# Patient Record
Sex: MALE | Race: OTHER | NOT HISPANIC OR LATINO | Employment: FULL TIME | URBAN - METROPOLITAN AREA
[De-identification: names, ages, dates, MRNs, and addresses within clinical notes are randomized per-mention and may not be internally consistent; named-entity substitution may affect disease eponyms.]

---

## 2019-10-29 ENCOUNTER — APPOINTMENT (OUTPATIENT)
Dept: RADIOLOGY | Facility: MEDICAL CENTER | Age: 39
DRG: 291 | End: 2019-10-29
Attending: EMERGENCY MEDICINE

## 2019-10-29 ENCOUNTER — HOSPITAL ENCOUNTER (INPATIENT)
Facility: MEDICAL CENTER | Age: 39
LOS: 5 days | DRG: 291 | End: 2019-11-03
Attending: EMERGENCY MEDICINE | Admitting: HOSPITALIST

## 2019-10-29 ENCOUNTER — APPOINTMENT (OUTPATIENT)
Dept: RADIOLOGY | Facility: MEDICAL CENTER | Age: 39
DRG: 291 | End: 2019-10-29

## 2019-10-29 DIAGNOSIS — I50.9 CONGESTIVE HEART FAILURE, UNSPECIFIED HF CHRONICITY, UNSPECIFIED HEART FAILURE TYPE (HCC): ICD-10-CM

## 2019-10-29 DIAGNOSIS — R60.1 ANASARCA: ICD-10-CM

## 2019-10-29 PROBLEM — N17.9 AKI (ACUTE KIDNEY INJURY) (HCC): Status: ACTIVE | Noted: 2019-10-29

## 2019-10-29 PROBLEM — K75.9 HEPATITIS: Status: ACTIVE | Noted: 2019-10-29

## 2019-10-29 PROBLEM — E66.811 CLASS 1 OBESITY DUE TO EXCESS CALORIES WITHOUT SERIOUS COMORBIDITY WITH BODY MASS INDEX (BMI) OF 31.0 TO 31.9 IN ADULT: Status: ACTIVE | Noted: 2019-10-29

## 2019-10-29 PROBLEM — E66.09 CLASS 1 OBESITY DUE TO EXCESS CALORIES WITHOUT SERIOUS COMORBIDITY WITH BODY MASS INDEX (BMI) OF 31.0 TO 31.9 IN ADULT: Status: ACTIVE | Noted: 2019-10-29

## 2019-10-29 LAB
ALBUMIN SERPL BCP-MCNC: 4 G/DL (ref 3.2–4.9)
ALBUMIN/GLOB SERPL: 1.6 G/DL
ALP SERPL-CCNC: 96 U/L (ref 30–99)
ALT SERPL-CCNC: 225 U/L (ref 2–50)
ANION GAP SERPL CALC-SCNC: 12 MMOL/L (ref 0–11.9)
AST SERPL-CCNC: 231 U/L (ref 12–45)
BASOPHILS # BLD AUTO: 1.1 % (ref 0–1.8)
BASOPHILS # BLD: 0.08 K/UL (ref 0–0.12)
BILIRUB SERPL-MCNC: 2.1 MG/DL (ref 0.1–1.5)
BUN SERPL-MCNC: 24 MG/DL (ref 8–22)
CALCIUM SERPL-MCNC: 8.8 MG/DL (ref 8.5–10.5)
CHLORIDE SERPL-SCNC: 102 MMOL/L (ref 96–112)
CO2 SERPL-SCNC: 22 MMOL/L (ref 20–33)
CREAT SERPL-MCNC: 1.67 MG/DL (ref 0.5–1.4)
EKG IMPRESSION: NORMAL
EOSINOPHIL # BLD AUTO: 0.12 K/UL (ref 0–0.51)
EOSINOPHIL NFR BLD: 1.6 % (ref 0–6.9)
ERYTHROCYTE [DISTWIDTH] IN BLOOD BY AUTOMATED COUNT: 52.4 FL (ref 35.9–50)
GLOBULIN SER CALC-MCNC: 2.5 G/DL (ref 1.9–3.5)
GLUCOSE SERPL-MCNC: 102 MG/DL (ref 65–99)
HCT VFR BLD AUTO: 57.2 % (ref 42–52)
HGB BLD-MCNC: 18.5 G/DL (ref 14–18)
IMM GRANULOCYTES # BLD AUTO: 0.02 K/UL (ref 0–0.11)
IMM GRANULOCYTES NFR BLD AUTO: 0.3 % (ref 0–0.9)
LYMPHOCYTES # BLD AUTO: 3.14 K/UL (ref 1–4.8)
LYMPHOCYTES NFR BLD: 43 % (ref 22–41)
MCH RBC QN AUTO: 32.7 PG (ref 27–33)
MCHC RBC AUTO-ENTMCNC: 32.3 G/DL (ref 33.7–35.3)
MCV RBC AUTO: 101.2 FL (ref 81.4–97.8)
MONOCYTES # BLD AUTO: 0.46 K/UL (ref 0–0.85)
MONOCYTES NFR BLD AUTO: 6.3 % (ref 0–13.4)
NEUTROPHILS # BLD AUTO: 3.49 K/UL (ref 1.82–7.42)
NEUTROPHILS NFR BLD: 47.7 % (ref 44–72)
NRBC # BLD AUTO: 0 K/UL
NRBC BLD-RTO: 0 /100 WBC
NT-PROBNP SERPL IA-MCNC: 3660 PG/ML (ref 0–125)
PLATELET # BLD AUTO: 260 K/UL (ref 164–446)
PMV BLD AUTO: 9.4 FL (ref 9–12.9)
POTASSIUM SERPL-SCNC: 4.9 MMOL/L (ref 3.6–5.5)
PROT SERPL-MCNC: 6.5 G/DL (ref 6–8.2)
RBC # BLD AUTO: 5.65 M/UL (ref 4.7–6.1)
SODIUM SERPL-SCNC: 136 MMOL/L (ref 135–145)
WBC # BLD AUTO: 7.3 K/UL (ref 4.8–10.8)

## 2019-10-29 PROCEDURE — 36415 COLL VENOUS BLD VENIPUNCTURE: CPT

## 2019-10-29 PROCEDURE — 99223 1ST HOSP IP/OBS HIGH 75: CPT | Mod: AI | Performed by: HOSPITALIST

## 2019-10-29 PROCEDURE — 84484 ASSAY OF TROPONIN QUANT: CPT | Mod: EDC

## 2019-10-29 PROCEDURE — 71275 CT ANGIOGRAPHY CHEST: CPT

## 2019-10-29 PROCEDURE — 83880 ASSAY OF NATRIURETIC PEPTIDE: CPT | Mod: EDC

## 2019-10-29 PROCEDURE — 700111 HCHG RX REV CODE 636 W/ 250 OVERRIDE (IP): Performed by: EMERGENCY MEDICINE

## 2019-10-29 PROCEDURE — 93005 ELECTROCARDIOGRAM TRACING: CPT | Performed by: EMERGENCY MEDICINE

## 2019-10-29 PROCEDURE — 71045 X-RAY EXAM CHEST 1 VIEW: CPT

## 2019-10-29 PROCEDURE — 700117 HCHG RX CONTRAST REV CODE 255: Performed by: EMERGENCY MEDICINE

## 2019-10-29 PROCEDURE — 85025 COMPLETE CBC W/AUTO DIFF WBC: CPT | Mod: EDC

## 2019-10-29 PROCEDURE — 80307 DRUG TEST PRSMV CHEM ANLYZR: CPT | Mod: EDC

## 2019-10-29 PROCEDURE — 770020 HCHG ROOM/CARE - TELE (206): Mod: EDC

## 2019-10-29 PROCEDURE — 80053 COMPREHEN METABOLIC PANEL: CPT | Mod: EDC

## 2019-10-29 PROCEDURE — 84443 ASSAY THYROID STIM HORMONE: CPT | Mod: EDC

## 2019-10-29 PROCEDURE — 93005 ELECTROCARDIOGRAM TRACING: CPT

## 2019-10-29 PROCEDURE — 99285 EMERGENCY DEPT VISIT HI MDM: CPT | Mod: EDC

## 2019-10-29 PROCEDURE — 96374 THER/PROPH/DIAG INJ IV PUSH: CPT | Mod: EDC

## 2019-10-29 RX ORDER — HEPARIN SODIUM 5000 [USP'U]/ML
5000 INJECTION, SOLUTION INTRAVENOUS; SUBCUTANEOUS EVERY 8 HOURS
Status: DISCONTINUED | OUTPATIENT
Start: 2019-10-30 | End: 2019-11-03 | Stop reason: HOSPADM

## 2019-10-29 RX ORDER — POLYETHYLENE GLYCOL 3350 17 G/17G
1 POWDER, FOR SOLUTION ORAL
Status: DISCONTINUED | OUTPATIENT
Start: 2019-10-29 | End: 2019-11-03 | Stop reason: HOSPADM

## 2019-10-29 RX ORDER — ONDANSETRON 2 MG/ML
4 INJECTION INTRAMUSCULAR; INTRAVENOUS EVERY 4 HOURS PRN
Status: DISCONTINUED | OUTPATIENT
Start: 2019-10-29 | End: 2019-11-03 | Stop reason: HOSPADM

## 2019-10-29 RX ORDER — PROCHLORPERAZINE EDISYLATE 5 MG/ML
5-10 INJECTION INTRAMUSCULAR; INTRAVENOUS EVERY 4 HOURS PRN
Status: DISCONTINUED | OUTPATIENT
Start: 2019-10-29 | End: 2019-11-03 | Stop reason: HOSPADM

## 2019-10-29 RX ORDER — CLONIDINE HYDROCHLORIDE 0.1 MG/1
0.1 TABLET ORAL EVERY 6 HOURS PRN
Status: DISCONTINUED | OUTPATIENT
Start: 2019-10-29 | End: 2019-11-03 | Stop reason: HOSPADM

## 2019-10-29 RX ORDER — FUROSEMIDE 10 MG/ML
40 INJECTION INTRAMUSCULAR; INTRAVENOUS
Status: DISCONTINUED | OUTPATIENT
Start: 2019-10-30 | End: 2019-11-03 | Stop reason: HOSPADM

## 2019-10-29 RX ORDER — BISACODYL 10 MG
10 SUPPOSITORY, RECTAL RECTAL
Status: DISCONTINUED | OUTPATIENT
Start: 2019-10-29 | End: 2019-11-03 | Stop reason: HOSPADM

## 2019-10-29 RX ORDER — PROMETHAZINE HYDROCHLORIDE 25 MG/1
12.5-25 SUPPOSITORY RECTAL EVERY 4 HOURS PRN
Status: DISCONTINUED | OUTPATIENT
Start: 2019-10-29 | End: 2019-11-03 | Stop reason: HOSPADM

## 2019-10-29 RX ORDER — PROMETHAZINE HYDROCHLORIDE 25 MG/1
12.5-25 TABLET ORAL EVERY 4 HOURS PRN
Status: DISCONTINUED | OUTPATIENT
Start: 2019-10-29 | End: 2019-11-03 | Stop reason: HOSPADM

## 2019-10-29 RX ORDER — ONDANSETRON 4 MG/1
4 TABLET, ORALLY DISINTEGRATING ORAL EVERY 4 HOURS PRN
Status: DISCONTINUED | OUTPATIENT
Start: 2019-10-29 | End: 2019-11-03 | Stop reason: HOSPADM

## 2019-10-29 RX ORDER — AMOXICILLIN 250 MG
2 CAPSULE ORAL 2 TIMES DAILY
Status: DISCONTINUED | OUTPATIENT
Start: 2019-10-30 | End: 2019-11-03 | Stop reason: HOSPADM

## 2019-10-29 RX ORDER — FUROSEMIDE 10 MG/ML
40 INJECTION INTRAMUSCULAR; INTRAVENOUS ONCE
Status: COMPLETED | OUTPATIENT
Start: 2019-10-29 | End: 2019-10-29

## 2019-10-29 RX ORDER — ACETAMINOPHEN 325 MG/1
650 TABLET ORAL EVERY 6 HOURS PRN
Status: DISCONTINUED | OUTPATIENT
Start: 2019-10-29 | End: 2019-11-03 | Stop reason: HOSPADM

## 2019-10-29 RX ADMIN — FUROSEMIDE 40 MG: 10 INJECTION, SOLUTION INTRAMUSCULAR; INTRAVENOUS at 23:24

## 2019-10-29 RX ADMIN — IOHEXOL 100 ML: 350 INJECTION, SOLUTION INTRAVENOUS at 20:33

## 2019-10-29 ASSESSMENT — ENCOUNTER SYMPTOMS
SHORTNESS OF BREATH: 1
EYES NEGATIVE: 1
MUSCULOSKELETAL NEGATIVE: 1
CONSTITUTIONAL NEGATIVE: 1
GASTROINTESTINAL NEGATIVE: 1
NEUROLOGICAL NEGATIVE: 1
PSYCHIATRIC NEGATIVE: 1

## 2019-10-30 ENCOUNTER — APPOINTMENT (OUTPATIENT)
Dept: CARDIOLOGY | Facility: MEDICAL CENTER | Age: 39
DRG: 291 | End: 2019-10-30
Attending: HOSPITALIST

## 2019-10-30 PROBLEM — I50.21 ACUTE SYSTOLIC HEART FAILURE (HCC): Status: ACTIVE | Noted: 2019-10-30

## 2019-10-30 PROBLEM — I42.9 CARDIOMYOPATHY (HCC): Status: ACTIVE | Noted: 2019-10-29

## 2019-10-30 LAB
AMPHET UR QL SCN: POSITIVE
ANION GAP SERPL CALC-SCNC: 10 MMOL/L (ref 0–11.9)
BARBITURATES UR QL SCN: NEGATIVE
BASOPHILS # BLD AUTO: 0.7 % (ref 0–1.8)
BASOPHILS # BLD: 0.06 K/UL (ref 0–0.12)
BENZODIAZ UR QL SCN: NEGATIVE
BUN SERPL-MCNC: 22 MG/DL (ref 8–22)
BZE UR QL SCN: NEGATIVE
CALCIUM SERPL-MCNC: 8.4 MG/DL (ref 8.5–10.5)
CANNABINOIDS UR QL SCN: NEGATIVE
CHLORIDE SERPL-SCNC: 105 MMOL/L (ref 96–112)
CO2 SERPL-SCNC: 23 MMOL/L (ref 20–33)
CREAT SERPL-MCNC: 1.35 MG/DL (ref 0.5–1.4)
EOSINOPHIL # BLD AUTO: 0.1 K/UL (ref 0–0.51)
EOSINOPHIL NFR BLD: 1.2 % (ref 0–6.9)
ERYTHROCYTE [DISTWIDTH] IN BLOOD BY AUTOMATED COUNT: 48.7 FL (ref 35.9–50)
GLUCOSE SERPL-MCNC: 154 MG/DL (ref 65–99)
HCT VFR BLD AUTO: 50.6 % (ref 42–52)
HGB BLD-MCNC: 17.1 G/DL (ref 14–18)
IMM GRANULOCYTES # BLD AUTO: 0.02 K/UL (ref 0–0.11)
IMM GRANULOCYTES NFR BLD AUTO: 0.2 % (ref 0–0.9)
LV EJECT FRACT  99904: 20
LV EJECT FRACT MOD 2C 99903: 26.42
LV EJECT FRACT MOD 4C 99902: 30.1
LV EJECT FRACT MOD BP 99901: 26.62
LYMPHOCYTES # BLD AUTO: 3.09 K/UL (ref 1–4.8)
LYMPHOCYTES NFR BLD: 38.3 % (ref 22–41)
MCH RBC QN AUTO: 32.7 PG (ref 27–33)
MCHC RBC AUTO-ENTMCNC: 33.8 G/DL (ref 33.7–35.3)
MCV RBC AUTO: 96.7 FL (ref 81.4–97.8)
METHADONE UR QL SCN: NEGATIVE
MONOCYTES # BLD AUTO: 0.72 K/UL (ref 0–0.85)
MONOCYTES NFR BLD AUTO: 8.9 % (ref 0–13.4)
NEUTROPHILS # BLD AUTO: 4.07 K/UL (ref 1.82–7.42)
NEUTROPHILS NFR BLD: 50.7 % (ref 44–72)
NRBC # BLD AUTO: 0.02 K/UL
NRBC BLD-RTO: 0.2 /100 WBC
NT-PROBNP SERPL IA-MCNC: 2748 PG/ML (ref 0–125)
OPIATES UR QL SCN: NEGATIVE
OXYCODONE UR QL SCN: NEGATIVE
PCP UR QL SCN: NEGATIVE
PLATELET # BLD AUTO: 271 K/UL (ref 164–446)
PMV BLD AUTO: 9 FL (ref 9–12.9)
POTASSIUM SERPL-SCNC: 3.6 MMOL/L (ref 3.6–5.5)
PROPOXYPH UR QL SCN: NEGATIVE
RBC # BLD AUTO: 5.23 M/UL (ref 4.7–6.1)
SODIUM SERPL-SCNC: 138 MMOL/L (ref 135–145)
TROPONIN T SERPL-MCNC: 102 NG/L (ref 6–19)
TROPONIN T SERPL-MCNC: 77 NG/L (ref 6–19)
TSH SERPL DL<=0.005 MIU/L-ACNC: 6.12 UIU/ML (ref 0.38–5.33)
WBC # BLD AUTO: 8.1 K/UL (ref 4.8–10.8)

## 2019-10-30 PROCEDURE — 83880 ASSAY OF NATRIURETIC PEPTIDE: CPT | Mod: EDC

## 2019-10-30 PROCEDURE — 90471 IMMUNIZATION ADMIN: CPT | Mod: EDC

## 2019-10-30 PROCEDURE — A9270 NON-COVERED ITEM OR SERVICE: HCPCS | Mod: EDC | Performed by: HOSPITALIST

## 2019-10-30 PROCEDURE — 85025 COMPLETE CBC W/AUTO DIFF WBC: CPT | Mod: EDC

## 2019-10-30 PROCEDURE — 84484 ASSAY OF TROPONIN QUANT: CPT | Mod: EDC

## 2019-10-30 PROCEDURE — 93306 TTE W/DOPPLER COMPLETE: CPT

## 2019-10-30 PROCEDURE — 99254 IP/OBS CNSLTJ NEW/EST MOD 60: CPT | Performed by: INTERNAL MEDICINE

## 2019-10-30 PROCEDURE — 700111 HCHG RX REV CODE 636 W/ 250 OVERRIDE (IP): Mod: EDC | Performed by: FAMILY MEDICINE

## 2019-10-30 PROCEDURE — 80048 BASIC METABOLIC PNL TOTAL CA: CPT | Mod: EDC

## 2019-10-30 PROCEDURE — 700102 HCHG RX REV CODE 250 W/ 637 OVERRIDE(OP): Mod: EDC | Performed by: HOSPITALIST

## 2019-10-30 PROCEDURE — 700117 HCHG RX CONTRAST REV CODE 255: Mod: EDC | Performed by: HOSPITALIST

## 2019-10-30 PROCEDURE — 99233 SBSQ HOSP IP/OBS HIGH 50: CPT | Performed by: HOSPITALIST

## 2019-10-30 PROCEDURE — 700111 HCHG RX REV CODE 636 W/ 250 OVERRIDE (IP): Mod: EDC | Performed by: HOSPITALIST

## 2019-10-30 PROCEDURE — 96376 TX/PRO/DX INJ SAME DRUG ADON: CPT | Mod: EDC

## 2019-10-30 PROCEDURE — 770020 HCHG ROOM/CARE - TELE (206): Mod: EDC

## 2019-10-30 PROCEDURE — 90686 IIV4 VACC NO PRSV 0.5 ML IM: CPT | Mod: EDC | Performed by: FAMILY MEDICINE

## 2019-10-30 PROCEDURE — 93306 TTE W/DOPPLER COMPLETE: CPT | Mod: 26 | Performed by: INTERNAL MEDICINE

## 2019-10-30 PROCEDURE — 96372 THER/PROPH/DIAG INJ SC/IM: CPT | Mod: EDC

## 2019-10-30 RX ADMIN — INFLUENZA A VIRUS A/BRISBANE/02/2018 IVR-190 (H1N1) ANTIGEN (FORMALDEHYDE INACTIVATED), INFLUENZA A VIRUS A/KANSAS/14/2017 X-327 (H3N2) ANTIGEN (FORMALDEHYDE INACTIVATED), INFLUENZA B VIRUS B/PHUKET/3073/2013 ANTIGEN (FORMALDEHYDE INACTIVATED), AND INFLUENZA B VIRUS B/MARYLAND/15/2016 BX-69A ANTIGEN (FORMALDEHYDE INACTIVATED) 0.5 ML: 15; 15; 15; 15 INJECTION, SUSPENSION INTRAMUSCULAR at 18:14

## 2019-10-30 RX ADMIN — HEPARIN SODIUM 5000 UNITS: 5000 INJECTION, SOLUTION INTRAVENOUS; SUBCUTANEOUS at 06:23

## 2019-10-30 RX ADMIN — FUROSEMIDE 40 MG: 10 INJECTION, SOLUTION INTRAMUSCULAR; INTRAVENOUS at 06:23

## 2019-10-30 RX ADMIN — FUROSEMIDE 40 MG: 10 INJECTION, SOLUTION INTRAMUSCULAR; INTRAVENOUS at 16:20

## 2019-10-30 RX ADMIN — HEPARIN SODIUM 5000 UNITS: 5000 INJECTION, SOLUTION INTRAVENOUS; SUBCUTANEOUS at 14:35

## 2019-10-30 RX ADMIN — SENNOSIDES, DOCUSATE SODIUM 2 TABLET: 50; 8.6 TABLET, FILM COATED ORAL at 06:23

## 2019-10-30 RX ADMIN — HUMAN ALBUMIN MICROSPHERES AND PERFLUTREN 3 ML: 10; .22 INJECTION, SOLUTION INTRAVENOUS at 15:54

## 2019-10-30 RX ADMIN — HEPARIN SODIUM 5000 UNITS: 5000 INJECTION, SOLUTION INTRAVENOUS; SUBCUTANEOUS at 21:20

## 2019-10-30 SDOH — HEALTH STABILITY: MENTAL HEALTH: HOW OFTEN DO YOU HAVE A DRINK CONTAINING ALCOHOL?: 2-4 TIMES A MONTH

## 2019-10-30 SDOH — HEALTH STABILITY: MENTAL HEALTH: HOW OFTEN DO YOU HAVE 6 OR MORE DRINKS ON ONE OCCASION?: MONTHLY

## 2019-10-30 SDOH — HEALTH STABILITY: MENTAL HEALTH: HOW MANY STANDARD DRINKS CONTAINING ALCOHOL DO YOU HAVE ON A TYPICAL DAY?: 10 OR MORE

## 2019-10-30 ASSESSMENT — COGNITIVE AND FUNCTIONAL STATUS - GENERAL
SUGGESTED CMS G CODE MODIFIER MOBILITY: CH
DRESSING REGULAR LOWER BODY CLOTHING: A LITTLE
DAILY ACTIVITIY SCORE: 23
MOBILITY SCORE: 24
SUGGESTED CMS G CODE MODIFIER DAILY ACTIVITY: CI

## 2019-10-30 ASSESSMENT — ENCOUNTER SYMPTOMS
ORTHOPNEA: 1
MYALGIAS: 0
VOMITING: 0
FLANK PAIN: 0
HEMOPTYSIS: 0
SORE THROAT: 0
EYE DISCHARGE: 0
DIZZINESS: 0
ACTIVITY CHANGE: 0
APPETITE CHANGE: 0
CHEST TIGHTNESS: 0
ABDOMINAL DISTENTION: 0
DIARRHEA: 0
HALLUCINATIONS: 0
NERVOUS/ANXIOUS: 0
AGITATION: 0
EYE ITCHING: 0
CHILLS: 0
WEAKNESS: 0
APNEA: 0
SHORTNESS OF BREATH: 1
SPUTUM PRODUCTION: 0
COUGH: 0
EYE REDNESS: 0
BACK PAIN: 0
EYE PAIN: 0
LOSS OF CONSCIOUSNESS: 0
ADENOPATHY: 0
STRIDOR: 0
FEVER: 0
SPEECH CHANGE: 0
BLOOD IN STOOL: 0
PND: 1
BRUISES/BLEEDS EASILY: 0
PALPITATIONS: 0
FOCAL WEAKNESS: 0
ABDOMINAL PAIN: 0
SEIZURES: 0
WEAKNESS: 1

## 2019-10-30 ASSESSMENT — LIFESTYLE VARIABLES
EVER_SMOKED: YES
HAVE YOU EVER FELT YOU SHOULD CUT DOWN ON YOUR DRINKING: YES
ALCOHOL_USE: YES

## 2019-10-30 ASSESSMENT — PATIENT HEALTH QUESTIONNAIRE - PHQ9
7. TROUBLE CONCENTRATING ON THINGS, SUCH AS READING THE NEWSPAPER OR WATCHING TELEVISION: MORE THAN HALF THE DAYS
5. POOR APPETITE OR OVEREATING: MORE THAN HALF THE DAYS
3. TROUBLE FALLING OR STAYING ASLEEP OR SLEEPING TOO MUCH: NOT AT ALL
6. FEELING BAD ABOUT YOURSELF - OR THAT YOU ARE A FAILURE OR HAVE LET YOURSELF OR YOUR FAMILY DOWN: NOT AL ALL
SUM OF ALL RESPONSES TO PHQ9 QUESTIONS 1 AND 2: 6
1. LITTLE INTEREST OR PLEASURE IN DOING THINGS: NEARLY EVERY DAY
2. FEELING DOWN, DEPRESSED, IRRITABLE, OR HOPELESS: NEARLY EVERY DAY
8. MOVING OR SPEAKING SO SLOWLY THAT OTHER PEOPLE COULD HAVE NOTICED. OR THE OPPOSITE, BEING SO FIGETY OR RESTLESS THAT YOU HAVE BEEN MOVING AROUND A LOT MORE THAN USUAL: NEARLY EVERY DAY
4. FEELING TIRED OR HAVING LITTLE ENERGY: NEARLY EVERY DAY
9. THOUGHTS THAT YOU WOULD BE BETTER OFF DEAD, OR OF HURTING YOURSELF: NOT AT ALL
SUM OF ALL RESPONSES TO PHQ QUESTIONS 1-9: 16

## 2019-10-30 ASSESSMENT — COPD QUESTIONNAIRES
IN THE PAST 12 MONTHS DO YOU DO LESS THAN YOU USED TO BECAUSE OF YOUR BREATHING PROBLEMS: DISAGREE/UNSURE
DO YOU EVER COUGH UP ANY MUCUS OR PHLEGM?: NO/ONLY WITH OCCASIONAL COLDS OR INFECTIONS
COPD SCREENING SCORE: 4
DURING THE PAST 4 WEEKS HOW MUCH DID YOU FEEL SHORT OF BREATH: MOST  OR ALL OF THE TIME
HAVE YOU SMOKED AT LEAST 100 CIGARETTES IN YOUR ENTIRE LIFE: YES

## 2019-10-30 NOTE — DISCHARGE PLANNING
Medical Social Work    Referral: Code Aorta    Intervention: Pt is a 39 year old male: Fariha Ortega (Frank) (: 1980).  Pt's friend is at bedside; however, she did not want to provide her name or contact information at this time.    Plan: SW will follow as needed.

## 2019-10-30 NOTE — ED NOTES
Med rec updated and complete. Allergies reviewed   Pt denies taking medications.  Home pharmacy [facility] .

## 2019-10-30 NOTE — ED NOTES
Patient resting comfortably. Chest rise and fall noted. Call light within reach. Patient given urinal and instructed to keep track of his input and output.  Will Continue to monitor.

## 2019-10-30 NOTE — PROGRESS NOTES
Hospital Medicine Daily Progress Note    Date of Service  10/30/2019    Chief Complaint  39 y.o. 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  Patient has been started on intravenous diuretics echocardiography ordered, and cardiology consulted.         Interval Problem Update  Tachycardic heart rate 110-119, continue cardiac monitoring.  We will hold on starting beta-blockers given his acute heart failure and methamphetamine use.  Saturating well on room air however reports shortness of breath with ambulation.  Marked lower extremity edema with bullae on the left lower extremity.  Acute kidney injury, creatinine 1.67, questionable if this is cardiorenal syndrome, will see response to diuretics.   Troponin mildly elevated 102, trended down to 77, no chest pain  BNP elevated 3660.   I am consulting cardiology, for concern for methamphetamine induced cardiomyopathy.  Patient has elevated liver enzymes, I will check hepatitis panel   Discussed with patient, cardiology patient's nurse and with multidisciplinary team during rounds including , pharmacist and charge nurse.      Consultants/Specialty  Cardiology    Code Status  Full    Disposition  Wants to leave tomorrow to catch courts at 2 PM   Will need follow-up with cardiology within 7 days    Review of Systems  Review of Systems   Constitutional: Positive for malaise/fatigue. Negative for chills and fever.   HENT: Negative for congestion and sore throat.    Eyes: Negative for pain, discharge and redness.   Respiratory: Positive for shortness of breath. Negative for cough, hemoptysis, sputum production and stridor.    Cardiovascular: Positive for orthopnea, leg swelling and PND. Negative for chest pain and palpitations.    Gastrointestinal: Negative for abdominal pain, blood in stool, diarrhea and vomiting.   Genitourinary: Negative for flank pain, hematuria and urgency.   Musculoskeletal: Negative for myalgias.   Skin: Negative for rash.   Neurological: Positive for weakness. Negative for speech change, focal weakness, seizures and loss of consciousness.   Endo/Heme/Allergies: Does not bruise/bleed easily.   Psychiatric/Behavioral: Negative for hallucinations and suicidal ideas. The patient is not nervous/anxious.       Physical Exam  Temp:  [36.5 °C (97.7 °F)] 36.5 °C (97.7 °F)  Pulse:  [110-120] 115  Resp:  [12-20] 20  BP: (100-152)/() 121/82  SpO2:  [84 %-100 %] 98 %    Physical Exam   Constitutional: He is oriented to person, place, and time. He appears well-developed.   HENT:   Head: Normocephalic and atraumatic.   Right Ear: External ear normal.   Left Ear: External ear normal.   Eyes: Pupils are equal, round, and reactive to light. Right eye exhibits no discharge. Left eye exhibits no discharge. No scleral icterus.   Neck: Normal range of motion. Neck supple. JVD present. No tracheal deviation present. No thyromegaly present.   Cardiovascular: Exam reveals no gallop and no friction rub.   Pulmonary/Chest: No stridor. He has no wheezes. He has rales.   Reduced air entry at the lower zones   B/L basal crepitations   Abdominal: Soft. He exhibits no distension. There is no tenderness. There is no rebound.   Musculoskeletal: He exhibits edema. He exhibits no tenderness or deformity.   Neurological: He is alert and oriented to person, place, and time. No cranial nerve deficit. Coordination normal.   Skin: Skin is warm and dry.   Psychiatric: He has a normal mood and affect. Judgment normal.   Nursing note and vitals reviewed.    Fluids    Intake/Output Summary (Last 24 hours) at 10/30/2019 1836  Last data filed at 10/30/2019 1830  Gross per 24 hour   Intake 480 ml   Output 6675 ml   Net -6195 ml       Laboratory  Recent Labs      10/29/19  2012 10/30/19  0300   WBC 7.3 8.1   RBC 5.65 5.23   HEMOGLOBIN 18.5* 17.1   HEMATOCRIT 57.2* 50.6   .2* 96.7   MCH 32.7 32.7   MCHC 32.3* 33.8   RDW 52.4* 48.7   PLATELETCT 260 271   MPV 9.4 9.0     Recent Labs     10/29/19  2012 10/30/19  0300   SODIUM 136 138   POTASSIUM 4.9 3.6   CHLORIDE 102 105   CO2 22 23   GLUCOSE 102* 154*   BUN 24* 22   CREATININE 1.67* 1.35   CALCIUM 8.8 8.4*                   Imaging  EC-ECHOCARDIOGRAM COMPLETE W/ CONT   Final Result      CT-CTA COMPLETE THORACOABDOMINAL AORTA   Final Result      Multichamber cardiac enlargement with inferior vena cava dilatation and anasarca. These findings are compatible with elevated end-diastolic pressures/congestive failure      Small right, minute left pleural effusions, small pericardial effusion, small ascites            DX-CHEST-PORTABLE (1 VIEW)   Final Result         1.  There is moderate cardiomegaly and mild perihilar congestive changes.      2.  No consolidations identified.           Assessment/Plan  * Acute systolic heart failure (HCC)- (present on admission)  Assessment & Plan  Diuresing.  Input and output.  Daily weights.  Watch volume status closely.  Continuous cardiac monitoring.  Not started on beta-blockers due to acute exacerbation and methamphetamine use.   Not started on ACE inhibitor or Spironolactone due to current renal function   Will need follow-up with cardiology within 7 days after discharge.    CHRIS (acute kidney injury) (HCC)  Assessment & Plan  Likely secondary to cardiorenal syndrome.  Watch response to diuretics     Cardiomyopathy (HCC)  Assessment & Plan  Likely secondary to methamphetamine   Counseling provided    Hepatitis  Assessment & Plan  I will check hepatitis panel   Likely secondary to hepatic congestion, continue to monitor, watch response to diuretics      Class 1 obesity due to excess calories without serious comorbidity with body mass index (BMI) of 31.0 to 31.9 in adult  Assessment &  Plan  Body mass index is 31.78 kg/m².  Counseling provided      VTE prophylaxis: Heparin SC

## 2019-10-30 NOTE — PROGRESS NOTES
Patient diagnosed with HF by admitting physician after he presented on 10/29/19 with SOB and LE edema.    Echocardiogram is pending. Patient has a strong family history of heart disease and utox is positive for amphetamines therefore, CM would not be a surprising or unexpected finding.    Nursing, please wait to begin education on HF until the echo is resulted and physician has discussed with the patient. Please see below for measures to initiate if diagnosis confirmed.    Daily Nurse: please begin to fill out the HF checklist (pink sheet in hard chart) and use it to guide your daily care.    Discharge Nurse: please ensure completeness of the HF checklist (pink sheet in hard chart) and have it co-signed by the charge RN before the patient leaves the hospital.    It looks like patient is uninsured. I've placed a SW order to assess social situation.    Thank you, Jessica, Cardiovascular Nurse Navigator, RN, CHFN [phone]    HF Measures:  1. Documentation of LV systolic function (echo or cath) PTA, during this hospitalization, or plan to assess post discharge or reason for not assessing documented.  2. ACE-I, ARNI or ARB prescribed on discharge for LVEF <40%  3. For HF patients with LVEF less than or equal to 40% evidence based beta blocker must be prescribed upon discharge one of the following: carvedilol, bisoprolol, Toprol XL  4. For EF less than or equal to 35% aldosterone blockade prescribed upon discharge  5. Nutrition consult for diet education  6. HF education documented daily  7. Screening for and administering immunizations as long as no contraindications: Pneumonia and Influenza  8. Written discharge instructions include:  ? Daily weights  ? Record weight on tracker  ? Bring tracker to appointments  ? Call MD for weight gain of 3lb /day or 5lb/week  ? HF medication teaching  ? Low sodium diet  ? Follow up appointment within seven calendar days of d/c must include: date, time and location  ? Activity  ? Worsening  symptoms  What if any of the above HF measures are contraindicated?  ? Request that the discharging provider document the medication/intervention and the contraindication specifically in a progress note  ? For example: “no CHF meds due to hypotension” is not enough. It needs to say: “No ACE-I, ARNI, ARB due to hypotension”; “No Beta Blockade due to bradycardia”…

## 2019-10-30 NOTE — H&P
Hospital Medicine History & Physical Note    Date of Service  10/29/2019    Primary Care Physician  Pcp Pt States None    Consultants  None    Code Status  Full code     Chief Complaint  SHortness of breath, swelling     History of Presenting Illness  39 y.o. male with no prior reported medical history but with strong family history of cardiac disease reports that he was in his usual state of health until the day before admission, when his co-workers noted that he had swollen legs and that he needed to get them checked out.  He is unaware of how long this has been going on.  He reports no additional symptoms on his own, however with some prompting he does endorse shortness of breath, and abdominal swelling as well.  HE denies fever or chills, he has no abdominal pain, nausea or vomiting.  He has no other complaints.      Review of Systems  Review of Systems   Constitutional: Negative.    HENT: Negative.    Eyes: Negative.    Respiratory: Positive for shortness of breath.    Cardiovascular: Positive for chest pain and leg swelling.   Gastrointestinal: Negative.    Genitourinary: Negative.    Musculoskeletal: Negative.    Skin: Negative.    Neurological: Negative.    Endo/Heme/Allergies: Negative.    Psychiatric/Behavioral: Negative.        Past Medical History   has no past medical history on file.    Surgical History   has no past surgical history on file.     Family History  family history includes Cancer in his father; Heart Disease in his mother; Hypertension in his mother.     Social History   reports that he has quit smoking. He has never used smokeless tobacco. He reports that he drinks alcohol. He reports that he has current or past drug history.    Allergies  No Known Allergies    Medications  None       Physical Exam  Temp:  [36.1 °C (97 °F)] 36.1 °C (97 °F)  Pulse:  [111-120] 118  Resp:  [16-18] 16  BP: (100-145)/(58-99) 139/93  SpO2:  [96 %-100 %] 100 %    Physical Exam   Constitutional: He is oriented to  person, place, and time. He appears well-developed and well-nourished. No distress.   HENT:   Head: Normocephalic and atraumatic.   Eyes: Pupils are equal, round, and reactive to light. Conjunctivae are normal.   Neck: Normal range of motion. Neck supple. No tracheal deviation present. No thyromegaly present.   Cardiovascular: Normal rate, regular rhythm and normal heart sounds. Exam reveals no gallop and no friction rub.   No murmur heard.  Pulmonary/Chest: Breath sounds normal. He is in respiratory distress. He has no wheezes.   Abdominal: Soft. Bowel sounds are normal. He exhibits distension. He exhibits no mass. There is no rebound and no guarding.   Musculoskeletal: Normal range of motion. He exhibits edema.   anasarca   Lymphadenopathy:     He has no cervical adenopathy.   Neurological: He is alert and oriented to person, place, and time. No cranial nerve deficit.   Skin: Skin is warm and dry. He is not diaphoretic. There is erythema.   Psychiatric: He has a normal mood and affect.   Nursing note and vitals reviewed.      Laboratory:  Recent Labs     10/29/19  2012   WBC 7.3   RBC 5.65   HEMOGLOBIN 18.5*   HEMATOCRIT 57.2*   .2*   MCH 32.7   MCHC 32.3*   RDW 52.4*   PLATELETCT 260   MPV 9.4     Recent Labs     10/29/19  2012   SODIUM 136   POTASSIUM 4.9   CHLORIDE 102   CO2 22   GLUCOSE 102*   BUN 24*   CREATININE 1.67*   CALCIUM 8.8     Recent Labs     10/29/19  2012   ALTSGPT 225*   ASTSGOT 231*   ALKPHOSPHAT 96   TBILIRUBIN 2.1*   GLUCOSE 102*         Recent Labs     10/29/19  2013   NTPROBNP 3660*         No results for input(s): TROPONINT in the last 72 hours.    Urinalysis:    No results found     Imaging:  CT-CTA COMPLETE THORACOABDOMINAL AORTA   Final Result      Multichamber cardiac enlargement with inferior vena cava dilatation and anasarca. These findings are compatible with elevated end-diastolic pressures/congestive failure      Small right, minute left pleural effusions, small pericardial  "effusion, small ascites            DX-CHEST-PORTABLE (1 VIEW)   Final Result         1.  There is moderate cardiomegaly and mild perihilar congestive changes.      2.  No consolidations identified.      EC-ECHOCARDIOGRAM COMPLETE W/O CONT    (Results Pending)         Assessment/Plan:  I anticipate this patient will require at least two midnights for appropriate medical management, necessitating inpatient admission.    * Acute congestive heart failure (HCC)  Assessment & Plan  Suspected based on symptoms, elevated BNP.  Not currently medicated at home for the same, reports \"Strong\" family history of the same.  Suspect result of methamphetamine abuse which I suspect is current.  Hold ACEI and BB for now, diurese and monitor, obtain echocardiogram.      Hepatitis  Assessment & Plan  Suspect due to underlying ischemia of heart failure.   Cannot rule out a component of alcohol abuse as well.  Monitor.     CHRIS (acute kidney injury) (HCC)  Assessment & Plan  Versus acute on chronic with no prior history of the same.   Monitor.  Avoid nephrotoxins.     Class 1 obesity due to excess calories without serious comorbidity with body mass index (BMI) of 31.0 to 31.9 in adult  Assessment & Plan  Body mass index is 31.78 kg/m².          VTE prophylaxis: SCD, heparin  "

## 2019-10-30 NOTE — ED PROVIDER NOTES
"ED Provider Note    CHIEF COMPLAINT  Chief Complaint   Patient presents with   • Peripheral Edema   • Shortness of Breath       HPI  Fariha Ortega is a 39 y.o. male here for evaluation of shortness of breath and leg swelling. The pt states that he had sob over the last few days, with increasing leg swelling.  The pt has some intermittent chest heaviness.  He has no vomiting, no fever, and no back pain.  The pt has been having some heaviness to bilateral lower legs, and states that this is not new over the last few weeks.  He states his coworkers have commented on this as well, and they state that they have noticed him getting \"bigger.\"  Nothing seems to alleviate or exacerbate symptoms.  Patient has some trouble getting up and walking around.  He states that he is relatively healthy, and he has no other medical concerns at this time.      ROS  See HPI for further details, o/w negative.     PAST MEDICAL HISTORY   No bleeding disorders    SOCIAL HISTORY  Social History     Tobacco Use   • Smoking status: Former Smoker   • Smokeless tobacco: Never Used   Substance and Sexual Activity   • Alcohol use: Yes     Comment: occ   • Drug use: Not Currently   • Sexual activity: Not on file       Family History  No bleeding is orders     SURGICAL HISTORY  patient denies any surgical history    CURRENT MEDICATIONS  Home Medications    **Home medications have not yet been reviewed for this encounter**         ALLERGIES  No Known Allergies    REVIEW OF SYSTEMS  See HPI for further details. Review of systems as above, otherwise all other systems are negative.     PHYSICAL EXAM  Constitutional: Well developed, well nourished.  Mild acute distress.  HEENT: Normocephalic, atraumatic. Posterior pharynx clear and moist.  Eyes:  EOMI. Normal sclera.  Neck: Supple, Full range of motion, nontender.  Chest/Pulmonary: clear to ausculation. Symmetrical expansion.   Cardio: Regular rate and rhythm with no murmur.   Abdomen: Soft, nontender. " No peritoneal signs. No guarding. No palpable masses.  Musculoskeletal: No deformity, 3+ pitting edema, neurovascular intact distally.  Neuro: Clear speech, appropriate, cooperative, cranial nerves II-XII grossly intact.  Psych: Normal mood and affect    Results for orders placed or performed during the hospital encounter of 10/29/19   CBC WITH DIFFERENTIAL   Result Value Ref Range    WBC 7.3 4.8 - 10.8 K/uL    RBC 5.65 4.70 - 6.10 M/uL    Hemoglobin 18.5 (H) 14.0 - 18.0 g/dL    Hematocrit 57.2 (H) 42.0 - 52.0 %    .2 (H) 81.4 - 97.8 fL    MCH 32.7 27.0 - 33.0 pg    MCHC 32.3 (L) 33.7 - 35.3 g/dL    RDW 52.4 (H) 35.9 - 50.0 fL    Platelet Count 260 164 - 446 K/uL    MPV 9.4 9.0 - 12.9 fL    Neutrophils-Polys 47.70 44.00 - 72.00 %    Lymphocytes 43.00 (H) 22.00 - 41.00 %    Monocytes 6.30 0.00 - 13.40 %    Eosinophils 1.60 0.00 - 6.90 %    Basophils 1.10 0.00 - 1.80 %    Immature Granulocytes 0.30 0.00 - 0.90 %    Nucleated RBC 0.00 /100 WBC    Neutrophils (Absolute) 3.49 1.82 - 7.42 K/uL    Lymphs (Absolute) 3.14 1.00 - 4.80 K/uL    Monos (Absolute) 0.46 0.00 - 0.85 K/uL    Eos (Absolute) 0.12 0.00 - 0.51 K/uL    Baso (Absolute) 0.08 0.00 - 0.12 K/uL    Immature Granulocytes (abs) 0.02 0.00 - 0.11 K/uL    NRBC (Absolute) 0.00 K/uL   COMP METABOLIC PANEL   Result Value Ref Range    Sodium 136 135 - 145 mmol/L    Potassium 4.9 3.6 - 5.5 mmol/L    Chloride 102 96 - 112 mmol/L    Co2 22 20 - 33 mmol/L    Anion Gap 12.0 (H) 0.0 - 11.9    Glucose 102 (H) 65 - 99 mg/dL    Bun 24 (H) 8 - 22 mg/dL    Creatinine 1.67 (H) 0.50 - 1.40 mg/dL    Calcium 8.8 8.5 - 10.5 mg/dL    AST(SGOT) 231 (H) 12 - 45 U/L    ALT(SGPT) 225 (H) 2 - 50 U/L    Alkaline Phosphatase 96 30 - 99 U/L    Total Bilirubin 2.1 (H) 0.1 - 1.5 mg/dL    Albumin 4.0 3.2 - 4.9 g/dL    Total Protein 6.5 6.0 - 8.2 g/dL    Globulin 2.5 1.9 - 3.5 g/dL    A-G Ratio 1.6 g/dL   proBrain Natriuretic Peptide, NT   Result Value Ref Range    NT-proBNP 3660 (H) 0 - 125  pg/mL   ESTIMATED GFR   Result Value Ref Range    GFR If  56 (A) >60 mL/min/1.73 m 2    GFR If Non  46 (A) >60 mL/min/1.73 m 2   EKG   Result Value Ref Range    Report       [facility]    Test Date:  2019-10-29  Pt Name:    JG NAYAK                  Department: ER  MRN:        [id number]                      Room:  Gender:     Male                         Technician: [id number]  :        1980                   Requested By:ER TRIAGE PROTOCOL  Order #:    [id number]                    Reading MD:    Measurements  Intervals                                Axis  Rate:       118                          P:          36  TX:         132                          QRS:        69  QRSD:       84                           T:  QT:         328  QTc:        460    Interpretive Statements  SINUS TACHYCARDIA  PROBABLE INFERIOR INFARCT, AGE INDETERMINATE  BORDERLINE R WAVE PROGRESSION, ANTERIOR LEADS  No previous ECG available for comparison        CT-CTA COMPLETE THORACOABDOMINAL AORTA   Final Result      Multichamber cardiac enlargement with inferior vena cava dilatation and anasarca. These findings are compatible with elevated end-diastolic pressures/congestive failure      Small right, minute left pleural effusions, small pericardial effusion, small ascites            DX-CHEST-PORTABLE (1 VIEW)   Final Result         1.  There is moderate cardiomegaly and mild perihilar congestive changes.      2.  No consolidations identified.          EKG shows sinus tach at a rate of 118, no ST elevation, no ST depression.  .  No comparison.    PROCEDURES     MEDICAL RECORD  I have reviewed patient's medical record and pertinent results are listed.    COURSE & MEDICAL DECISION MAKING  I have reviewed any medical record information, laboratory studies and radiographic results as noted above.    Patient will be admitted to the hospitalist, Dr. Winslow, for diuresis and  possible cardiology evaluation and consult.      FINAL IMPRESSION  CHF exacerbation  Anasarca       Electronically signed by: Allen Leo, 10/29/2019 10:31 PM

## 2019-10-30 NOTE — ED TRIAGE NOTES
"PT ambulated to triage c/o bilateral leg swelling x 1 week, and increased SOB, no HX   Chief Complaint   Patient presents with   • Peripheral Edema   • Shortness of Breath     /99   Pulse (!) 111   Temp 36.1 °C (97 °F) (Temporal)   Resp 16   Ht 1.727 m (5' 8\")   Wt 94.8 kg (208 lb 15.9 oz)   SpO2 96%     "

## 2019-10-30 NOTE — ED NOTES
Break RN:  Pt's urinal emptied of 1L clear yellow urine.   Repeat troponin tubed to lab.   Pt resting in bed with no complaints, call light within reach.

## 2019-10-30 NOTE — ED NOTES
Break RN:  Beny from Lab called with critical result of Troponin 102 at 00:10. Critical lab result read back to Beny.   Dr. Leo notified of critical lab result at 00:17.  Critical lab result read back by Dr. Leo.

## 2019-10-30 NOTE — ED NOTES
Attending Hospitalist today is Dr Kennedy starting at 0700. Please call this Physician for orders, updates and questions.

## 2019-10-30 NOTE — CONSULTS
Cardiology Initial Consultation    Date of Service  10/30/2019    Referring Physician  Jael Kennedy M.D.    Reason for Consultation  Edema shortness of breath, concern for heart failure    History of Presenting Illness  Fariha Ortega is a 39 y.o. male with no past medical history but endorses polysubstance abuse including methamphetamine IV orally and inhaled    States he feels much better.  In fact he is hungry and asked me before food.  He is hoping to stay the night because he has a court meeting tomorrow and is wondering if he could miss it.    Endorses drug use, at first he denied it and when told his tox screen was positive admits to stressors both psychosocial and lack of access.  He does have a family history of coronary disease but details are unknown    He has no health insurance    Review of Systems  Review of Systems   Constitutional: Negative for activity change and appetite change.   HENT: Negative for congestion and drooling.    Eyes: Negative for discharge and itching.   Respiratory: Negative for apnea and chest tightness.    Gastrointestinal: Negative for abdominal distention and abdominal pain.   Endocrine: Negative for cold intolerance and heat intolerance.   Genitourinary: Negative for difficulty urinating and dysuria.   Musculoskeletal: Negative for back pain.   Allergic/Immunologic: Negative for environmental allergies and food allergies.   Neurological: Negative for dizziness and weakness.   Hematological: Negative for adenopathy. Does not bruise/bleed easily.   Psychiatric/Behavioral: Negative for agitation and behavioral problems.   All other systems reviewed and are negative.      Past Medical History  none    Surgical History  none    Family History  family history includes Cancer in his father; Heart Disease in his mother; Hypertension in his mother.    Social History   reports that he has quit smoking. He has never used smokeless tobacco. He reports that he drinks about 9.0 oz of  alcohol per week. He reports that he has current or past drug history.    Medications  None       Allergies  No Known Allergies    Vital signs in last 24 hours  Temp:  [36.1 °C (97 °F)-36.5 °C (97.7 °F)] 36.5 °C (97.7 °F)  Pulse:  [110-120] 115  Resp:  [12-20] 20  BP: (100-152)/() 121/82  SpO2:  [84 %-100 %] 98 %    Physical Exam  Physical Exam   Constitutional: No distress.   Watching TV, eating rapidly, no acute distress talkative   HENT:   Nose: Nose normal.   Mouth/Throat: No oropharyngeal exudate.   Eyes: Pupils are equal, round, and reactive to light. EOM are normal.   Neck: Neck supple. No JVD present. No thyromegaly present.   Cardiovascular: Regular rhythm and intact distal pulses. Tachycardia present. Exam reveals no friction rub.   Murmur (2 out of 6 systolic blowing at the apex) heard.  Pulmonary/Chest: Breath sounds normal. No respiratory distress. He exhibits no tenderness.   Abdominal: Soft. Bowel sounds are normal. He exhibits no distension.   Musculoskeletal: He exhibits edema (1+ pitting bilaterally). He exhibits no tenderness.   Lymphadenopathy:     He has no cervical adenopathy.   Skin: Skin is warm and dry. He is not diaphoretic.   Psychiatric: He has a normal mood and affect. His behavior is normal.       Lab Review  Lab Results   Component Value Date/Time    WBC 8.1 10/30/2019 03:00 AM    RBC 5.23 10/30/2019 03:00 AM    HEMOGLOBIN 17.1 10/30/2019 03:00 AM    HEMATOCRIT 50.6 10/30/2019 03:00 AM    MCV 96.7 10/30/2019 03:00 AM    MCH 32.7 10/30/2019 03:00 AM    MCHC 33.8 10/30/2019 03:00 AM    MPV 9.0 10/30/2019 03:00 AM      Lab Results   Component Value Date/Time    SODIUM 138 10/30/2019 03:00 AM    POTASSIUM 3.6 10/30/2019 03:00 AM    CHLORIDE 105 10/30/2019 03:00 AM    CO2 23 10/30/2019 03:00 AM    GLUCOSE 154 (H) 10/30/2019 03:00 AM    BUN 22 10/30/2019 03:00 AM    CREATININE 1.35 10/30/2019 03:00 AM      Lab Results   Component Value Date/Time    ASTSGOT 231 (H) 10/29/2019 08:12 PM     ALTSGPT 225 (H) 10/29/2019 08:12 PM     Lab Results   Component Value Date/Time    TROPONINT 77 (H) 10/30/2019 01:10 AM       Recent Labs     10/29/19  2013 10/30/19  0110   NTPROBNP 3660* 2748*       Cardiac Imaging and Procedures Review  EKG:  My personal interpretation of the EKG dated yesterday shows sinus tachycardia 120 beats a minute no ischemic changes normal QRS    CAT scan shows enlarged heart, no PE, no effusion    Imaging  Chest X-Ray: No significant infiltrates no effusions    Assessment/Plan    Heart failure  Echocardiogram is pending  His clinical exam is reassuring without evidence of significant volume overload  I agree clinically that his elevated biomarkers suggest underlying systolic dysfunction, this is likely chronic not acute based on his clinical picture and lack of follow-up and heavy use of drugs    Lack of access/current drug use/abuse  We had a long discussion about follow-up adherence and his prognosis  I argue that we will not start him on beta-blockade particularly with the positive methamphetamine.  He is also not sure if he can be compliant taking these  I feel strongly he does not need to stay in the hospital tonight.  When he needs clearly is follow-up which I have arranged.  He has an appointment to see us in the office this Friday.  At that point if he is not using drugs, we will talk about long-term prognosis drug use and prescription medications to heal any damage that has been done to his heart    I spoke to his hospitalist about this.  I also think it is quite reasonable to him to keep his appointment with the courts tomorrow    Thank you for allowing me to participate in the care of this patient.  I will follow him closely inpatient especially keeping an eye on his pending echocardiogram  Please contact me with any questions.    Kirstin Skinner M.D.   Cardiologist, [facility]  [phone]

## 2019-10-31 ENCOUNTER — APPOINTMENT (OUTPATIENT)
Dept: RADIOLOGY | Facility: MEDICAL CENTER | Age: 39
DRG: 291 | End: 2019-10-31
Attending: FAMILY MEDICINE

## 2019-10-31 ENCOUNTER — PATIENT OUTREACH (OUTPATIENT)
Dept: HEALTH INFORMATION MANAGEMENT | Facility: OTHER | Age: 39
End: 2019-10-31

## 2019-10-31 PROBLEM — R79.89 ELEVATED TSH: Status: ACTIVE | Noted: 2019-10-31

## 2019-10-31 PROBLEM — E87.6 HYPOKALEMIA: Status: ACTIVE | Noted: 2019-10-31

## 2019-10-31 PROBLEM — R73.9 HYPERGLYCEMIA: Status: ACTIVE | Noted: 2019-10-31

## 2019-10-31 PROBLEM — E83.42 HYPOMAGNESEMIA: Status: ACTIVE | Noted: 2019-10-31

## 2019-10-31 PROBLEM — R79.89 ELEVATED LFTS: Status: ACTIVE | Noted: 2019-10-29

## 2019-10-31 PROBLEM — I10 ESSENTIAL HYPERTENSION: Status: ACTIVE | Noted: 2019-10-31

## 2019-10-31 PROBLEM — R79.89 ELEVATED TROPONIN: Status: ACTIVE | Noted: 2019-10-31

## 2019-10-31 LAB
ALBUMIN SERPL BCP-MCNC: 3.2 G/DL (ref 3.2–4.9)
ALBUMIN/GLOB SERPL: 1.5 G/DL
ALP SERPL-CCNC: 80 U/L (ref 30–99)
ALT SERPL-CCNC: 167 U/L (ref 2–50)
ANION GAP SERPL CALC-SCNC: 10 MMOL/L (ref 0–11.9)
AST SERPL-CCNC: 139 U/L (ref 12–45)
BASOPHILS # BLD AUTO: 0.8 % (ref 0–1.8)
BASOPHILS # BLD: 0.07 K/UL (ref 0–0.12)
BILIRUB SERPL-MCNC: 1.5 MG/DL (ref 0.1–1.5)
BUN SERPL-MCNC: 24 MG/DL (ref 8–22)
CALCIUM SERPL-MCNC: 8.5 MG/DL (ref 8.5–10.5)
CHLORIDE SERPL-SCNC: 100 MMOL/L (ref 96–112)
CO2 SERPL-SCNC: 27 MMOL/L (ref 20–33)
CREAT SERPL-MCNC: 1.49 MG/DL (ref 0.5–1.4)
EOSINOPHIL # BLD AUTO: 0.13 K/UL (ref 0–0.51)
EOSINOPHIL NFR BLD: 1.5 % (ref 0–6.9)
ERYTHROCYTE [DISTWIDTH] IN BLOOD BY AUTOMATED COUNT: 50.4 FL (ref 35.9–50)
EST. AVERAGE GLUCOSE BLD GHB EST-MCNC: 137 MG/DL
GLOBULIN SER CALC-MCNC: 2.2 G/DL (ref 1.9–3.5)
GLUCOSE SERPL-MCNC: 124 MG/DL (ref 65–99)
HAV IGM SERPL QL IA: NEGATIVE
HBA1C MFR BLD: 6.4 % (ref 0–5.6)
HBV CORE IGM SER QL: NEGATIVE
HBV SURFACE AG SER QL: NEGATIVE
HCT VFR BLD AUTO: 49.4 % (ref 42–52)
HCV AB SER QL: NEGATIVE
HGB BLD-MCNC: 16.3 G/DL (ref 14–18)
IMM GRANULOCYTES # BLD AUTO: 0.03 K/UL (ref 0–0.11)
IMM GRANULOCYTES NFR BLD AUTO: 0.3 % (ref 0–0.9)
INR PPP: 1.2 (ref 0.87–1.13)
LYMPHOCYTES # BLD AUTO: 3.32 K/UL (ref 1–4.8)
LYMPHOCYTES NFR BLD: 38.2 % (ref 22–41)
MAGNESIUM SERPL-MCNC: 1.4 MG/DL (ref 1.5–2.5)
MCH RBC QN AUTO: 32.3 PG (ref 27–33)
MCHC RBC AUTO-ENTMCNC: 33 G/DL (ref 33.7–35.3)
MCV RBC AUTO: 97.8 FL (ref 81.4–97.8)
MONOCYTES # BLD AUTO: 0.7 K/UL (ref 0–0.85)
MONOCYTES NFR BLD AUTO: 8.1 % (ref 0–13.4)
NEUTROPHILS # BLD AUTO: 4.43 K/UL (ref 1.82–7.42)
NEUTROPHILS NFR BLD: 51.1 % (ref 44–72)
NRBC # BLD AUTO: 0 K/UL
NRBC BLD-RTO: 0 /100 WBC
PHOSPHATE SERPL-MCNC: 4.5 MG/DL (ref 2.5–4.5)
PLATELET # BLD AUTO: 260 K/UL (ref 164–446)
PMV BLD AUTO: 9.4 FL (ref 9–12.9)
POTASSIUM SERPL-SCNC: 3.4 MMOL/L (ref 3.6–5.5)
PROT SERPL-MCNC: 5.4 G/DL (ref 6–8.2)
PROTHROMBIN TIME: 15.5 SEC (ref 12–14.6)
PTH-INTACT SERPL-MCNC: 113.3 PG/ML (ref 14–72)
RBC # BLD AUTO: 5.05 M/UL (ref 4.7–6.1)
SODIUM SERPL-SCNC: 137 MMOL/L (ref 135–145)
T3FREE SERPL-MCNC: 2.45 PG/ML (ref 2.4–4.2)
T4 FREE SERPL-MCNC: 0.94 NG/DL (ref 0.53–1.43)
WBC # BLD AUTO: 8.7 K/UL (ref 4.8–10.8)

## 2019-10-31 PROCEDURE — 99233 SBSQ HOSP IP/OBS HIGH 50: CPT | Performed by: FAMILY MEDICINE

## 2019-10-31 PROCEDURE — 84439 ASSAY OF FREE THYROXINE: CPT | Mod: EDC

## 2019-10-31 PROCEDURE — 700102 HCHG RX REV CODE 250 W/ 637 OVERRIDE(OP): Mod: EDC | Performed by: FAMILY MEDICINE

## 2019-10-31 PROCEDURE — 84481 FREE ASSAY (FT-3): CPT | Mod: EDC

## 2019-10-31 PROCEDURE — 84100 ASSAY OF PHOSPHORUS: CPT | Mod: EDC

## 2019-10-31 PROCEDURE — 770020 HCHG ROOM/CARE - TELE (206): Mod: EDC

## 2019-10-31 PROCEDURE — 80074 ACUTE HEPATITIS PANEL: CPT | Mod: EDC

## 2019-10-31 PROCEDURE — 83036 HEMOGLOBIN GLYCOSYLATED A1C: CPT | Mod: EDC

## 2019-10-31 PROCEDURE — 700111 HCHG RX REV CODE 636 W/ 250 OVERRIDE (IP): Mod: EDC | Performed by: FAMILY MEDICINE

## 2019-10-31 PROCEDURE — A9270 NON-COVERED ITEM OR SERVICE: HCPCS | Mod: EDC | Performed by: FAMILY MEDICINE

## 2019-10-31 PROCEDURE — 83970 ASSAY OF PARATHORMONE: CPT | Mod: EDC

## 2019-10-31 PROCEDURE — 700111 HCHG RX REV CODE 636 W/ 250 OVERRIDE (IP): Mod: EDC | Performed by: HOSPITALIST

## 2019-10-31 PROCEDURE — 85610 PROTHROMBIN TIME: CPT | Mod: EDC

## 2019-10-31 PROCEDURE — 36415 COLL VENOUS BLD VENIPUNCTURE: CPT | Mod: EDC

## 2019-10-31 PROCEDURE — 76775 US EXAM ABDO BACK WALL LIM: CPT

## 2019-10-31 PROCEDURE — 80053 COMPREHEN METABOLIC PANEL: CPT | Mod: EDC

## 2019-10-31 PROCEDURE — 85025 COMPLETE CBC W/AUTO DIFF WBC: CPT | Mod: EDC

## 2019-10-31 PROCEDURE — 83735 ASSAY OF MAGNESIUM: CPT | Mod: EDC

## 2019-10-31 RX ORDER — ATORVASTATIN CALCIUM 40 MG/1
40 TABLET, FILM COATED ORAL
Status: DISCONTINUED | OUTPATIENT
Start: 2019-10-31 | End: 2019-11-03 | Stop reason: HOSPADM

## 2019-10-31 RX ORDER — POTASSIUM CHLORIDE 20 MEQ/1
20 TABLET, EXTENDED RELEASE ORAL 2 TIMES DAILY
Status: DISCONTINUED | OUTPATIENT
Start: 2019-10-31 | End: 2019-11-02

## 2019-10-31 RX ORDER — ASPIRIN 325 MG
325 TABLET ORAL DAILY
Status: DISCONTINUED | OUTPATIENT
Start: 2019-10-31 | End: 2019-11-03 | Stop reason: HOSPADM

## 2019-10-31 RX ORDER — MAGNESIUM SULFATE HEPTAHYDRATE 40 MG/ML
2 INJECTION, SOLUTION INTRAVENOUS ONCE
Status: COMPLETED | OUTPATIENT
Start: 2019-10-31 | End: 2019-10-31

## 2019-10-31 RX ADMIN — HEPARIN SODIUM 5000 UNITS: 5000 INJECTION, SOLUTION INTRAVENOUS; SUBCUTANEOUS at 16:07

## 2019-10-31 RX ADMIN — MAGNESIUM SULFATE 2 G: 2 INJECTION INTRAVENOUS at 09:07

## 2019-10-31 RX ADMIN — FUROSEMIDE 40 MG: 10 INJECTION, SOLUTION INTRAMUSCULAR; INTRAVENOUS at 16:07

## 2019-10-31 RX ADMIN — ASPIRIN 325 MG: 325 TABLET, FILM COATED ORAL at 12:23

## 2019-10-31 RX ADMIN — HEPARIN SODIUM 5000 UNITS: 5000 INJECTION, SOLUTION INTRAVENOUS; SUBCUTANEOUS at 06:08

## 2019-10-31 RX ADMIN — FUROSEMIDE 40 MG: 10 INJECTION, SOLUTION INTRAMUSCULAR; INTRAVENOUS at 06:09

## 2019-10-31 RX ADMIN — POTASSIUM CHLORIDE 20 MEQ: 20 TABLET, EXTENDED RELEASE ORAL at 09:07

## 2019-10-31 RX ADMIN — HEPARIN SODIUM 5000 UNITS: 5000 INJECTION, SOLUTION INTRAVENOUS; SUBCUTANEOUS at 20:32

## 2019-10-31 RX ADMIN — POTASSIUM CHLORIDE 20 MEQ: 20 TABLET, EXTENDED RELEASE ORAL at 17:52

## 2019-10-31 RX ADMIN — ATORVASTATIN CALCIUM 40 MG: 40 TABLET, FILM COATED ORAL at 20:32

## 2019-10-31 ASSESSMENT — LIFESTYLE VARIABLES
EVER FELT BAD OR GUILTY ABOUT YOUR DRINKING: NO
TOTAL SCORE: 0
HOW MANY TIMES IN THE PAST YEAR HAVE YOU HAD 5 OR MORE DRINKS IN A DAY: 0
ALCOHOL_USE: NO
ON A TYPICAL DAY WHEN YOU DRINK ALCOHOL HOW MANY DRINKS DO YOU HAVE: 0
EVER HAD A DRINK FIRST THING IN THE MORNING TO STEADY YOUR NERVES TO GET RID OF A HANGOVER: NO
TOTAL SCORE: 0
HAVE YOU EVER FELT YOU SHOULD CUT DOWN ON YOUR DRINKING: NO
AVERAGE NUMBER OF DAYS PER WEEK YOU HAVE A DRINK CONTAINING ALCOHOL: 0
CONSUMPTION TOTAL: NEGATIVE
TOTAL SCORE: 0
HAVE PEOPLE ANNOYED YOU BY CRITICIZING YOUR DRINKING: NO

## 2019-10-31 ASSESSMENT — PATIENT HEALTH QUESTIONNAIRE - PHQ9
5. POOR APPETITE OR OVEREATING: MORE THAN HALF THE DAYS
SUM OF ALL RESPONSES TO PHQ QUESTIONS 1-9: 16
7. TROUBLE CONCENTRATING ON THINGS, SUCH AS READING THE NEWSPAPER OR WATCHING TELEVISION: MORE THAN HALF THE DAYS
9. THOUGHTS THAT YOU WOULD BE BETTER OFF DEAD, OR OF HURTING YOURSELF: NOT AT ALL
6. FEELING BAD ABOUT YOURSELF - OR THAT YOU ARE A FAILURE OR HAVE LET YOURSELF OR YOUR FAMILY DOWN: NOT AL ALL
8. MOVING OR SPEAKING SO SLOWLY THAT OTHER PEOPLE COULD HAVE NOTICED. OR THE OPPOSITE, BEING SO FIGETY OR RESTLESS THAT YOU HAVE BEEN MOVING AROUND A LOT MORE THAN USUAL: NEARLY EVERY DAY
1. LITTLE INTEREST OR PLEASURE IN DOING THINGS: NEARLY EVERY DAY
3. TROUBLE FALLING OR STAYING ASLEEP OR SLEEPING TOO MUCH: NOT AT ALL
2. FEELING DOWN, DEPRESSED, IRRITABLE, OR HOPELESS: NEARLY EVERY DAY
SUM OF ALL RESPONSES TO PHQ9 QUESTIONS 1 AND 2: 6
4. FEELING TIRED OR HAVING LITTLE ENERGY: NEARLY EVERY DAY

## 2019-10-31 ASSESSMENT — ENCOUNTER SYMPTOMS
FLANK PAIN: 0
SORE THROAT: 0
HEADACHES: 0
DIARRHEA: 0
NERVOUS/ANXIOUS: 0
DIZZINESS: 0
BLURRED VISION: 0
DIAPHORESIS: 0
COUGH: 0
PALPITATIONS: 0
FEVER: 0
CHILLS: 0
SHORTNESS OF BREATH: 1
NECK PAIN: 0
BACK PAIN: 0
NAUSEA: 0
VOMITING: 0
HEARTBURN: 0
WHEEZING: 0
ABDOMINAL PAIN: 0

## 2019-10-31 NOTE — DISCHARGE PLANNING
RN CM informed by bedside RN that patient will not be discharging today and he has a court date for later today.  RN CM spoke with patient who confirms his court date is today at 1400. CHRISTIAN signed by patient and hospital admission letter faxed to [phone], [facility] [address] asking for extension of court date for case # [id number].

## 2019-10-31 NOTE — PROGRESS NOTES
Assumed care of patient.  Pt resting in bed, no complaints at this time.  Safety precautions in place with bed in lowest, locked position.  Call bell in reach and pt verbalized understanding of use.

## 2019-10-31 NOTE — CARE PLAN
Problem: Communication  Goal: The ability to communicate needs accurately and effectively will improve  Outcome: PROGRESSING AS EXPECTED     Problem: Infection  Goal: Will remain free from infection  Outcome: PROGRESSING AS EXPECTED     Problem: Bowel/Gastric:  Goal: Normal bowel function is maintained or improved  Outcome: PROGRESSING AS EXPECTED     Problem: Knowledge Deficit  Goal: Knowledge of disease process/condition, treatment plan, diagnostic tests, and medications will improve  Outcome: PROGRESSING AS EXPECTED     Problem: Discharge Barriers/Planning  Goal: Patient's continuum of care needs will be met  Outcome: PROGRESSING AS EXPECTED     Problem: Fluid Volume:  Goal: Will maintain balanced intake and output  Outcome: PROGRESSING AS EXPECTED

## 2019-10-31 NOTE — PROGRESS NOTES
[facility] Medicine Daily Progress Note    Date of Service  10/31/2019    Chief Complaint  39 y.o. male admitted 10/29/2019 with CHF.    Hospital Course  Admitted with congestive heart failure and cardiomyopathy.    Interval Problem Update  CHF/CM - EF 20%  CHRIS - crea better  Hyperglycemia - BS 150s  Low potassium, magnesium    Consultants/Specialty  Cardiology    Code Status  Full    Disposition  TBD    Review of Systems  Review of Systems   Constitutional: Positive for malaise/fatigue. Negative for chills, diaphoresis and fever.   HENT: Negative for hearing loss and sore throat.    Eyes: Negative for blurred vision.   Respiratory: Positive for shortness of breath. Negative for cough and wheezing.    Cardiovascular: Positive for leg swelling. Negative for chest pain and palpitations.   Gastrointestinal: Negative for abdominal pain, diarrhea, heartburn, nausea and vomiting.   Genitourinary: Negative for dysuria, flank pain and hematuria.   Musculoskeletal: Negative for back pain and neck pain.   Skin: Negative for rash.   Neurological: Negative for dizziness and headaches.   Psychiatric/Behavioral: The patient is not nervous/anxious.         Physical Exam  Temp:  [36.3 °C (97.3 °F)-36.7 °C (98 °F)] 36.5 °C (97.7 °F)  Pulse:  [107-117] 107  Resp:  [16-20] 20  BP: (121-133)/(82-93) 127/90  SpO2:  [92 %-100 %] 92 %    Physical Exam   Constitutional: He is oriented to person, place, and time. He appears well-developed and well-nourished.   HENT:   Head: Normocephalic and atraumatic.   Eyes: Pupils are equal, round, and reactive to light. Conjunctivae are normal.   Neck: No tracheal deviation present. No thyromegaly present.   Cardiovascular: Normal rate and regular rhythm.   Pulmonary/Chest: Effort normal. He has rales.   Abdominal: Soft. Bowel sounds are normal. He exhibits no distension. There is no tenderness.   Musculoskeletal: He exhibits edema.   Lymphadenopathy:     He has no cervical adenopathy.   Neurological: He  is alert and oriented to person, place, and time.   Skin:   Blister on left leg   Nursing note and vitals reviewed.      Fluids    Intake/Output Summary (Last 24 hours) at 10/31/2019 1048  Last data filed at 10/31/2019 0907  Gross per 24 hour   Intake 240 ml   Output 3575 ml   Net -3335 ml       Laboratory  Recent Labs     10/29/19  2012 10/30/19  0300 10/31/19  0138   WBC 7.3 8.1 8.7   RBC 5.65 5.23 5.05   HEMOGLOBIN 18.5* 17.1 16.3   HEMATOCRIT 57.2* 50.6 49.4   .2* 96.7 97.8   MCH 32.7 32.7 32.3   MCHC 32.3* 33.8 33.0*   RDW 52.4* 48.7 50.4*   PLATELETCT 260 271 260   MPV 9.4 9.0 9.4     Recent Labs     10/29/19  2012 10/30/19  0300 10/31/19  0138   SODIUM 136 138 137   POTASSIUM 4.9 3.6 3.4*   CHLORIDE 102 105 100   CO2 22 23 27   GLUCOSE 102* 154* 124*   BUN 24* 22 24*   CREATININE 1.67* 1.35 1.49*   CALCIUM 8.8 8.4* 8.5                   Imaging  US-RENAL   Final Result      Minimal right hydronephrosis.      Post void residual 21.8 mL.         EC-ECHOCARDIOGRAM COMPLETE W/ CONT   Final Result      CT-CTA COMPLETE THORACOABDOMINAL AORTA   Final Result      Multichamber cardiac enlargement with inferior vena cava dilatation and anasarca. These findings are compatible with elevated end-diastolic pressures/congestive failure      Small right, minute left pleural effusions, small pericardial effusion, small ascites            DX-CHEST-PORTABLE (1 VIEW)   Final Result         1.  There is moderate cardiomegaly and mild perihilar congestive changes.      2.  No consolidations identified.           Assessment/Plan  * Acute systolic heart failure (HCC)- (present on admission)  Assessment & Plan  IV Lasix for diuresis    CHRIS (acute kidney injury) (HCC)- (present on admission)  Assessment & Plan  Check PTH, UA, Renal US  Follow bmp    Cardiomyopathy (HCC)- (present on admission)  Assessment & Plan  IV Lasix for diuresis    Elevated troponin- (present on admission)  Assessment & Plan  Start ASA,  Lipitor    Elevated TSH- (present on admission)  Assessment & Plan  Check FT4 and FT3    Hypomagnesemia- (present on admission)  Assessment & Plan  IV Mg today    Hypokalemia- (present on admission)  Assessment & Plan  Start Kdur, follow BMP, Ph    Hyperglycemia- (present on admission)  Assessment & Plan  Check hemoglobin A1c    Elevated LFTs- (present on admission)  Assessment & Plan  Hepatic congestion?  Follow cmp    Class 1 obesity due to excess calories without serious comorbidity with body mass index (BMI) of 31.0 to 31.9 in adult  Assessment & Plan  Body mass index is 31.78 kg/m².         VTE prophylaxis: Heparin

## 2019-10-31 NOTE — DISCHARGE PLANNING
RN KARLEY attempted to assess patient, patient gone for US per bedside RNAshu. Will attempt again at a later time.    Per PFA notes, patient stating that he has insurance but does not know what it is or have any info regarding insurance. PFA to screen patient when they are able.

## 2019-11-01 PROBLEM — E87.6 HYPOKALEMIA: Chronic | Status: ACTIVE | Noted: 2019-10-31

## 2019-11-01 LAB
ALBUMIN SERPL BCP-MCNC: 3.5 G/DL (ref 3.2–4.9)
ALBUMIN/GLOB SERPL: 1.5 G/DL
ALP SERPL-CCNC: 90 U/L (ref 30–99)
ALT SERPL-CCNC: 138 U/L (ref 2–50)
ANION GAP SERPL CALC-SCNC: 12 MMOL/L (ref 0–11.9)
AST SERPL-CCNC: 103 U/L (ref 12–45)
BASOPHILS # BLD AUTO: 0.9 % (ref 0–1.8)
BASOPHILS # BLD: 0.08 K/UL (ref 0–0.12)
BILIRUB SERPL-MCNC: 1.2 MG/DL (ref 0.1–1.5)
BUN SERPL-MCNC: 27 MG/DL (ref 8–22)
CALCIUM SERPL-MCNC: 8.9 MG/DL (ref 8.5–10.5)
CHLORIDE SERPL-SCNC: 103 MMOL/L (ref 96–112)
CHOLEST SERPL-MCNC: 117 MG/DL (ref 100–199)
CO2 SERPL-SCNC: 22 MMOL/L (ref 20–33)
CREAT SERPL-MCNC: 1.38 MG/DL (ref 0.5–1.4)
EOSINOPHIL # BLD AUTO: 0.24 K/UL (ref 0–0.51)
EOSINOPHIL NFR BLD: 2.8 % (ref 0–6.9)
ERYTHROCYTE [DISTWIDTH] IN BLOOD BY AUTOMATED COUNT: 51.7 FL (ref 35.9–50)
GLOBULIN SER CALC-MCNC: 2.4 G/DL (ref 1.9–3.5)
GLUCOSE SERPL-MCNC: 127 MG/DL (ref 65–99)
HCT VFR BLD AUTO: 50.5 % (ref 42–52)
HDLC SERPL-MCNC: 35 MG/DL
HGB BLD-MCNC: 16.6 G/DL (ref 14–18)
IMM GRANULOCYTES # BLD AUTO: 0.04 K/UL (ref 0–0.11)
IMM GRANULOCYTES NFR BLD AUTO: 0.5 % (ref 0–0.9)
LDLC SERPL CALC-MCNC: 43 MG/DL
LYMPHOCYTES # BLD AUTO: 3.02 K/UL (ref 1–4.8)
LYMPHOCYTES NFR BLD: 34.9 % (ref 22–41)
MAGNESIUM SERPL-MCNC: 1.7 MG/DL (ref 1.5–2.5)
MCH RBC QN AUTO: 33 PG (ref 27–33)
MCHC RBC AUTO-ENTMCNC: 32.9 G/DL (ref 33.7–35.3)
MCV RBC AUTO: 100.4 FL (ref 81.4–97.8)
MONOCYTES # BLD AUTO: 0.65 K/UL (ref 0–0.85)
MONOCYTES NFR BLD AUTO: 7.5 % (ref 0–13.4)
NEUTROPHILS # BLD AUTO: 4.62 K/UL (ref 1.82–7.42)
NEUTROPHILS NFR BLD: 53.4 % (ref 44–72)
NRBC # BLD AUTO: 0 K/UL
NRBC BLD-RTO: 0 /100 WBC
PLATELET # BLD AUTO: 260 K/UL (ref 164–446)
PMV BLD AUTO: 9.3 FL (ref 9–12.9)
POTASSIUM SERPL-SCNC: 4 MMOL/L (ref 3.6–5.5)
PROT SERPL-MCNC: 5.9 G/DL (ref 6–8.2)
RBC # BLD AUTO: 5.03 M/UL (ref 4.7–6.1)
SODIUM SERPL-SCNC: 137 MMOL/L (ref 135–145)
TRIGL SERPL-MCNC: 196 MG/DL (ref 0–149)
WBC # BLD AUTO: 8.7 K/UL (ref 4.8–10.8)

## 2019-11-01 PROCEDURE — A9270 NON-COVERED ITEM OR SERVICE: HCPCS | Mod: EDC | Performed by: FAMILY MEDICINE

## 2019-11-01 PROCEDURE — 85025 COMPLETE CBC W/AUTO DIFF WBC: CPT | Mod: EDC

## 2019-11-01 PROCEDURE — 770020 HCHG ROOM/CARE - TELE (206): Mod: EDC

## 2019-11-01 PROCEDURE — 700111 HCHG RX REV CODE 636 W/ 250 OVERRIDE (IP): Mod: EDC | Performed by: HOSPITALIST

## 2019-11-01 PROCEDURE — 80061 LIPID PANEL: CPT | Mod: EDC

## 2019-11-01 PROCEDURE — 80053 COMPREHEN METABOLIC PANEL: CPT | Mod: EDC

## 2019-11-01 PROCEDURE — 99233 SBSQ HOSP IP/OBS HIGH 50: CPT | Performed by: FAMILY MEDICINE

## 2019-11-01 PROCEDURE — 700102 HCHG RX REV CODE 250 W/ 637 OVERRIDE(OP): Mod: EDC | Performed by: FAMILY MEDICINE

## 2019-11-01 PROCEDURE — 700111 HCHG RX REV CODE 636 W/ 250 OVERRIDE (IP): Mod: EDC | Performed by: FAMILY MEDICINE

## 2019-11-01 PROCEDURE — 83735 ASSAY OF MAGNESIUM: CPT | Mod: EDC

## 2019-11-01 PROCEDURE — 36415 COLL VENOUS BLD VENIPUNCTURE: CPT | Mod: EDC

## 2019-11-01 RX ORDER — MAGNESIUM SULFATE HEPTAHYDRATE 40 MG/ML
2 INJECTION, SOLUTION INTRAVENOUS ONCE
Status: COMPLETED | OUTPATIENT
Start: 2019-11-01 | End: 2019-11-01

## 2019-11-01 RX ADMIN — POTASSIUM CHLORIDE 20 MEQ: 20 TABLET, EXTENDED RELEASE ORAL at 17:39

## 2019-11-01 RX ADMIN — HEPARIN SODIUM 5000 UNITS: 5000 INJECTION, SOLUTION INTRAVENOUS; SUBCUTANEOUS at 05:49

## 2019-11-01 RX ADMIN — POTASSIUM CHLORIDE 20 MEQ: 20 TABLET, EXTENDED RELEASE ORAL at 05:49

## 2019-11-01 RX ADMIN — ATORVASTATIN CALCIUM 40 MG: 40 TABLET, FILM COATED ORAL at 19:53

## 2019-11-01 RX ADMIN — HEPARIN SODIUM 5000 UNITS: 5000 INJECTION, SOLUTION INTRAVENOUS; SUBCUTANEOUS at 19:53

## 2019-11-01 RX ADMIN — MAGNESIUM SULFATE IN WATER 2 G: 40 INJECTION, SOLUTION INTRAVENOUS at 10:02

## 2019-11-01 RX ADMIN — ASPIRIN 325 MG: 325 TABLET, FILM COATED ORAL at 05:49

## 2019-11-01 RX ADMIN — FUROSEMIDE 40 MG: 10 INJECTION, SOLUTION INTRAMUSCULAR; INTRAVENOUS at 05:49

## 2019-11-01 RX ADMIN — HEPARIN SODIUM 5000 UNITS: 5000 INJECTION, SOLUTION INTRAVENOUS; SUBCUTANEOUS at 16:01

## 2019-11-01 RX ADMIN — FUROSEMIDE 40 MG: 10 INJECTION, SOLUTION INTRAMUSCULAR; INTRAVENOUS at 16:01

## 2019-11-01 ASSESSMENT — ENCOUNTER SYMPTOMS
NECK PAIN: 0
BACK PAIN: 0
HEARTBURN: 0
SORE THROAT: 0
DIAPHORESIS: 0
ABDOMINAL PAIN: 0
DIZZINESS: 0
PALPITATIONS: 0
COUGH: 0
SHORTNESS OF BREATH: 1
FLANK PAIN: 0
NAUSEA: 0
VOMITING: 0
BLURRED VISION: 0
FEVER: 0
HEADACHES: 0
NERVOUS/ANXIOUS: 0
DIARRHEA: 0
WHEEZING: 0
CHILLS: 0

## 2019-11-01 ASSESSMENT — PATIENT HEALTH QUESTIONNAIRE - PHQ9
2. FEELING DOWN, DEPRESSED, IRRITABLE, OR HOPELESS: NOT AT ALL
1. LITTLE INTEREST OR PLEASURE IN DOING THINGS: NOT AT ALL
SUM OF ALL RESPONSES TO PHQ9 QUESTIONS 1 AND 2: 0

## 2019-11-01 NOTE — PROGRESS NOTES
Assumed care of patient.  Pt A,Ox4, resting comfortably in bed.  Safety precautions in place:  Bed in lowest, locked position and call bell in reach, pt using call bell appropriately.

## 2019-11-01 NOTE — CARE PLAN
Problem: Communication  Goal: The ability to communicate needs accurately and effectively will improve  Outcome: PROGRESSING AS EXPECTED     Problem: Safety  Goal: Will remain free from injury  Outcome: PROGRESSING AS EXPECTED     Problem: Bowel/Gastric:  Goal: Will not experience complications related to bowel motility  Outcome: PROGRESSING AS EXPECTED     Problem: Knowledge Deficit  Goal: Knowledge of the prescribed therapeutic regimen will improve  Outcome: PROGRESSING AS EXPECTED     Problem: Discharge Barriers/Planning  Goal: Patient's continuum of care needs will be met  Outcome: PROGRESSING AS EXPECTED     Problem: Fluid Volume:  Goal: Will maintain balanced intake and output  Outcome: PROGRESSING AS EXPECTED

## 2019-11-01 NOTE — PROGRESS NOTES
[facility] Medicine Daily Progress Note    Date of Service  11/1/2019    Chief Complaint  39 y.o. male admitted 10/29/2019 with CHF.    Hospital Course  Admitted with congestive heart failure and cardiomyopathy.    Interval Problem Update  CHF/CM - EF 20%  CHRIS - crea stable, PTH elevated  Hyperglycemia - hgba1c 6.4  Low magnesium  HTN - sbp 130s    Consultants/Specialty  Cardiology    Code Status  Full    Disposition  TBD    Review of Systems  Review of Systems   Constitutional: Positive for malaise/fatigue. Negative for chills, diaphoresis and fever.   HENT: Negative for hearing loss and sore throat.    Eyes: Negative for blurred vision.   Respiratory: Positive for shortness of breath. Negative for cough and wheezing.    Cardiovascular: Positive for leg swelling. Negative for chest pain and palpitations.   Gastrointestinal: Negative for abdominal pain, diarrhea, heartburn, nausea and vomiting.   Genitourinary: Negative for dysuria, flank pain and hematuria.   Musculoskeletal: Negative for back pain and neck pain.   Skin: Negative for rash.   Neurological: Negative for dizziness and headaches.   Psychiatric/Behavioral: The patient is not nervous/anxious.         Physical Exam  Temp:  [35.9 °C (96.6 °F)-36.5 °C (97.7 °F)] 36.3 °C (97.4 °F)  Pulse:  [] 60  Resp:  [16-18] 18  BP: (108-146)/() 122/85  SpO2:  [95 %-100 %] 100 %    Physical Exam   Constitutional: He is oriented to person, place, and time. He appears well-developed and well-nourished.   HENT:   Head: Normocephalic and atraumatic.   Eyes: Pupils are equal, round, and reactive to light. Conjunctivae are normal.   Neck: No tracheal deviation present. No thyromegaly present.   Cardiovascular: Normal rate and regular rhythm.   Pulmonary/Chest: Effort normal. He has rales.   Abdominal: Soft. Bowel sounds are normal. He exhibits no distension. There is no tenderness.   Musculoskeletal: He exhibits edema.   Lymphadenopathy:     He has no cervical  adenopathy.   Neurological: He is alert and oriented to person, place, and time.   Skin:   Blister on left leg   Nursing note and vitals reviewed.      Fluids    Intake/Output Summary (Last 24 hours) at 11/1/2019 1154  Last data filed at 11/1/2019 0800  Gross per 24 hour   Intake 120 ml   Output 1550 ml   Net -1430 ml       Laboratory  Recent Labs     10/30/19  0300 10/31/19  0138 11/01/19  0406   WBC 8.1 8.7 8.7   RBC 5.23 5.05 5.03   HEMOGLOBIN 17.1 16.3 16.6   HEMATOCRIT 50.6 49.4 50.5   MCV 96.7 97.8 100.4*   MCH 32.7 32.3 33.0   MCHC 33.8 33.0* 32.9*   RDW 48.7 50.4* 51.7*   PLATELETCT 271 260 260   MPV 9.0 9.4 9.3     Recent Labs     10/30/19  0300 10/31/19  0138 11/01/19  0406   SODIUM 138 137 137   POTASSIUM 3.6 3.4* 4.0   CHLORIDE 105 100 103   CO2 23 27 22   GLUCOSE 154* 124* 127*   BUN 22 24* 27*   CREATININE 1.35 1.49* 1.38   CALCIUM 8.4* 8.5 8.9     Recent Labs     10/31/19  1052   INR 1.20*         Recent Labs     11/01/19  0406   TRIGLYCERIDE 196*   HDL 35*   LDL 43       Imaging  US-RENAL   Final Result      Minimal right hydronephrosis.      Post void residual 21.8 mL.         EC-ECHOCARDIOGRAM COMPLETE W/ CONT   Final Result      CT-CTA COMPLETE THORACOABDOMINAL AORTA   Final Result      Multichamber cardiac enlargement with inferior vena cava dilatation and anasarca. These findings are compatible with elevated end-diastolic pressures/congestive failure      Small right, minute left pleural effusions, small pericardial effusion, small ascites            DX-CHEST-PORTABLE (1 VIEW)   Final Result         1.  There is moderate cardiomegaly and mild perihilar congestive changes.      2.  No consolidations identified.           Assessment/Plan  * Acute systolic heart failure (HCC)- (present on admission)  Assessment & Plan  IV Lasix for diuresis    CHRIS (acute kidney injury) (HCC)- (present on admission)  Assessment & Plan  Likely with CKD  Follow bmp    Cardiomyopathy (HCC)- (present on  admission)  Assessment & Plan  IV Lasix for diuresis    Essential hypertension- (present on admission)  Assessment & Plan  monitor    Elevated troponin- (present on admission)  Assessment & Plan  ASA, Lipitor    Elevated TSH- (present on admission)  Assessment & Plan  FT4 and FT3 wnl    Hypomagnesemia- (present on admission)  Assessment & Plan  IV Mg again today    Hypokalemia- (present on admission)  Assessment & Plan  Kdur, follow BMP    Hyperglycemia- (present on admission)  Assessment & Plan  Prediabetes with hgba1c 6.4    Elevated LFTs- (present on admission)  Assessment & Plan  Hepatic congestion?  Follow cmp    Class 1 obesity due to excess calories without serious comorbidity with body mass index (BMI) of 31.0 to 31.9 in adult- (present on admission)  Assessment & Plan  Body mass index is 31.78 kg/m².       VTE prophylaxis: Heparin

## 2019-11-02 PROBLEM — I47.29 NONSUSTAINED VENTRICULAR TACHYCARDIA (HCC): Status: ACTIVE | Noted: 2019-11-02

## 2019-11-02 LAB
ANION GAP SERPL CALC-SCNC: 13 MMOL/L (ref 0–11.9)
BUN SERPL-MCNC: 31 MG/DL (ref 8–22)
CALCIUM SERPL-MCNC: 8.6 MG/DL (ref 8.5–10.5)
CHLORIDE SERPL-SCNC: 101 MMOL/L (ref 96–112)
CO2 SERPL-SCNC: 21 MMOL/L (ref 20–33)
CREAT SERPL-MCNC: 1.37 MG/DL (ref 0.5–1.4)
FOLATE SERPL-MCNC: 17.5 NG/ML
GLUCOSE SERPL-MCNC: 201 MG/DL (ref 65–99)
MAGNESIUM SERPL-MCNC: 1.9 MG/DL (ref 1.5–2.5)
MAGNESIUM SERPL-MCNC: 2.4 MG/DL (ref 1.5–2.5)
NT-PROBNP SERPL IA-MCNC: 2814 PG/ML (ref 0–125)
POTASSIUM SERPL-SCNC: 4.7 MMOL/L (ref 3.6–5.5)
SODIUM SERPL-SCNC: 135 MMOL/L (ref 135–145)
VIT B12 SERPL-MCNC: 1206 PG/ML (ref 211–911)

## 2019-11-02 PROCEDURE — 700105 HCHG RX REV CODE 258: Mod: EDC

## 2019-11-02 PROCEDURE — 80048 BASIC METABOLIC PNL TOTAL CA: CPT | Mod: EDC

## 2019-11-02 PROCEDURE — 83880 ASSAY OF NATRIURETIC PEPTIDE: CPT | Mod: EDC

## 2019-11-02 PROCEDURE — 36415 COLL VENOUS BLD VENIPUNCTURE: CPT | Mod: EDC

## 2019-11-02 PROCEDURE — 82607 VITAMIN B-12: CPT | Mod: EDC

## 2019-11-02 PROCEDURE — 700111 HCHG RX REV CODE 636 W/ 250 OVERRIDE (IP): Mod: EDC | Performed by: HOSPITALIST

## 2019-11-02 PROCEDURE — 99233 SBSQ HOSP IP/OBS HIGH 50: CPT | Performed by: FAMILY MEDICINE

## 2019-11-02 PROCEDURE — A9270 NON-COVERED ITEM OR SERVICE: HCPCS | Mod: EDC | Performed by: FAMILY MEDICINE

## 2019-11-02 PROCEDURE — 700102 HCHG RX REV CODE 250 W/ 637 OVERRIDE(OP): Mod: EDC | Performed by: FAMILY MEDICINE

## 2019-11-02 PROCEDURE — 700111 HCHG RX REV CODE 636 W/ 250 OVERRIDE (IP): Mod: EDC | Performed by: FAMILY MEDICINE

## 2019-11-02 PROCEDURE — 770020 HCHG ROOM/CARE - TELE (206): Mod: EDC

## 2019-11-02 PROCEDURE — 82746 ASSAY OF FOLIC ACID SERUM: CPT | Mod: EDC

## 2019-11-02 PROCEDURE — 83735 ASSAY OF MAGNESIUM: CPT | Mod: 91,EDC

## 2019-11-02 RX ORDER — SODIUM CHLORIDE 9 MG/ML
INJECTION, SOLUTION INTRAVENOUS
Status: COMPLETED
Start: 2019-11-02 | End: 2019-11-02

## 2019-11-02 RX ORDER — MAGNESIUM SULFATE HEPTAHYDRATE 40 MG/ML
2 INJECTION, SOLUTION INTRAVENOUS ONCE
Status: COMPLETED | OUTPATIENT
Start: 2019-11-02 | End: 2019-11-02

## 2019-11-02 RX ADMIN — FUROSEMIDE 40 MG: 10 INJECTION, SOLUTION INTRAMUSCULAR; INTRAVENOUS at 05:00

## 2019-11-02 RX ADMIN — MAGNESIUM SULFATE IN WATER 2 G: 40 INJECTION, SOLUTION INTRAVENOUS at 12:40

## 2019-11-02 RX ADMIN — SODIUM CHLORIDE 500 ML: 9 INJECTION, SOLUTION INTRAVENOUS at 12:40

## 2019-11-02 RX ADMIN — FUROSEMIDE 40 MG: 10 INJECTION, SOLUTION INTRAMUSCULAR; INTRAVENOUS at 17:09

## 2019-11-02 RX ADMIN — POTASSIUM CHLORIDE 20 MEQ: 20 TABLET, EXTENDED RELEASE ORAL at 05:00

## 2019-11-02 RX ADMIN — ASPIRIN 325 MG: 325 TABLET, FILM COATED ORAL at 05:00

## 2019-11-02 RX ADMIN — ATORVASTATIN CALCIUM 40 MG: 40 TABLET, FILM COATED ORAL at 17:09

## 2019-11-02 ASSESSMENT — ENCOUNTER SYMPTOMS
CHILLS: 0
FEVER: 0
ABDOMINAL PAIN: 0
BLURRED VISION: 0
COUGH: 0
DIZZINESS: 0
FLANK PAIN: 0
VOMITING: 0
PALPITATIONS: 0
SHORTNESS OF BREATH: 1
WHEEZING: 0
BACK PAIN: 0
NECK PAIN: 0
DIARRHEA: 0
NERVOUS/ANXIOUS: 0
NAUSEA: 0
HEARTBURN: 0
HEADACHES: 0
SORE THROAT: 0
DIAPHORESIS: 0

## 2019-11-02 ASSESSMENT — PATIENT HEALTH QUESTIONNAIRE - PHQ9
2. FEELING DOWN, DEPRESSED, IRRITABLE, OR HOPELESS: NOT AT ALL
SUM OF ALL RESPONSES TO PHQ9 QUESTIONS 1 AND 2: 0
1. LITTLE INTEREST OR PLEASURE IN DOING THINGS: NOT AT ALL

## 2019-11-02 NOTE — PROGRESS NOTES
[facility] Medicine Daily Progress Note    Date of Service  11/2/2019    Chief Complaint  39 y.o. male admitted 10/29/2019 with CHF.    Hospital Course  Admitted with congestive heart failure and cardiomyopathy.    Interval Problem Update  CHF/CM - EF 20%  CHRIS - crea stable, PTH elevated  Low magnesium  HTN - sbp 120s  Vtahc - 4 beats    Consultants/Specialty  Cardiology    Code Status  Full    Disposition  TBD    Review of Systems  Review of Systems   Constitutional: Positive for malaise/fatigue. Negative for chills, diaphoresis and fever.   HENT: Negative for hearing loss and sore throat.    Eyes: Negative for blurred vision.   Respiratory: Positive for shortness of breath. Negative for cough and wheezing.    Cardiovascular: Positive for leg swelling. Negative for chest pain and palpitations.   Gastrointestinal: Negative for abdominal pain, diarrhea, heartburn, nausea and vomiting.   Genitourinary: Negative for dysuria, flank pain and hematuria.   Musculoskeletal: Negative for back pain and neck pain.   Skin: Negative for rash.   Neurological: Negative for dizziness and headaches.   Psychiatric/Behavioral: The patient is not nervous/anxious.         Physical Exam  Temp:  [35.9 °C (96.7 °F)-36.4 °C (97.6 °F)] 35.9 °C (96.7 °F)  Pulse:  [114-123] 116  Resp:  [17-20] 20  BP: (122-149)/() 124/88  SpO2:  [92 %-99 %] 94 %    Physical Exam   Constitutional: He is oriented to person, place, and time. He appears well-developed and well-nourished.   HENT:   Head: Normocephalic and atraumatic.   Eyes: Pupils are equal, round, and reactive to light. Conjunctivae are normal.   Neck: No tracheal deviation present. No thyromegaly present.   Cardiovascular: Normal rate and regular rhythm.   Pulmonary/Chest: Effort normal. He has rales.   Abdominal: Soft. Bowel sounds are normal. He exhibits no distension. There is no tenderness.   Musculoskeletal: He exhibits edema.   Lymphadenopathy:     He has no cervical adenopathy.    Neurological: He is alert and oriented to person, place, and time.   Skin:   Blister on left leg   Nursing note and vitals reviewed.      Fluids    Intake/Output Summary (Last 24 hours) at 11/2/2019 1126  Last data filed at 11/2/2019 0735  Gross per 24 hour   Intake 820 ml   Output 800 ml   Net 20 ml       Laboratory  Recent Labs     10/31/19  0138 11/01/19  0406   WBC 8.7 8.7   RBC 5.05 5.03   HEMOGLOBIN 16.3 16.6   HEMATOCRIT 49.4 50.5   MCV 97.8 100.4*   MCH 32.3 33.0   MCHC 33.0* 32.9*   RDW 50.4* 51.7*   PLATELETCT 260 260   MPV 9.4 9.3     Recent Labs     10/31/19  0138 11/01/19  0406 11/02/19  0836   SODIUM 137 137 135   POTASSIUM 3.4* 4.0 4.7   CHLORIDE 100 103 101   CO2 27 22 21   GLUCOSE 124* 127* 201*   BUN 24* 27* 31*   CREATININE 1.49* 1.38 1.37   CALCIUM 8.5 8.9 8.6     Recent Labs     10/31/19  1052   INR 1.20*         Recent Labs     11/01/19  0406   TRIGLYCERIDE 196*   HDL 35*   LDL 43       Imaging  US-RENAL   Final Result      Minimal right hydronephrosis.      Post void residual 21.8 mL.         EC-ECHOCARDIOGRAM COMPLETE W/ CONT   Final Result      CT-CTA COMPLETE THORACOABDOMINAL AORTA   Final Result      Multichamber cardiac enlargement with inferior vena cava dilatation and anasarca. These findings are compatible with elevated end-diastolic pressures/congestive failure      Small right, minute left pleural effusions, small pericardial effusion, small ascites            DX-CHEST-PORTABLE (1 VIEW)   Final Result         1.  There is moderate cardiomegaly and mild perihilar congestive changes.      2.  No consolidations identified.           Assessment/Plan  * Acute systolic heart failure (HCC)- (present on admission)  Assessment & Plan  IV Lasix for diuresis    CHRIS (acute kidney injury) (HCC)- (present on admission)  Assessment & Plan  Likely with CKD  Follow bmp    Cardiomyopathy (HCC)- (present on admission)  Assessment & Plan  IV Lasix for diuresis    Nonsustained ventricular tachycardia  (HCC)- (present on admission)  Assessment & Plan  Telemetry monitoring  IV Mg    Essential hypertension- (present on admission)  Assessment & Plan  monitor    Elevated troponin- (present on admission)  Assessment & Plan  ASA, Lipitor    Elevated TSH- (present on admission)  Assessment & Plan  FT4 and FT3 wnl    Hypomagnesemia- (present on admission)  Assessment & Plan  IV Mg again today    Hypokalemia- (present on admission)  Assessment & Plan  Stop Kdur, follow BMP    Hyperglycemia- (present on admission)  Assessment & Plan  Prediabetes with hgba1c 6.4    Elevated LFTs- (present on admission)  Assessment & Plan  Hepatic congestion?  Follow cmp    Class 1 obesity due to excess calories without serious comorbidity with body mass index (BMI) of 31.0 to 31.9 in adult- (present on admission)  Assessment & Plan  Body mass index is 31.78 kg/m².       VTE prophylaxis: Heparin

## 2019-11-02 NOTE — PROGRESS NOTES
Received report from night shift RN Luis Alfredo. Assumed care of patient. Patient is ST on the monitor at this time. Patient declines any needs at this time. POC discussed with patient. Bed locked and in the lowest position with call light within reach.

## 2019-11-03 VITALS
DIASTOLIC BLOOD PRESSURE: 98 MMHG | RESPIRATION RATE: 15 BRPM | OXYGEN SATURATION: 92 % | HEART RATE: 105 BPM | WEIGHT: 185.19 LBS | BODY MASS INDEX: 28.07 KG/M2 | SYSTOLIC BLOOD PRESSURE: 127 MMHG | HEIGHT: 68 IN | TEMPERATURE: 97.5 F

## 2019-11-03 LAB — MAGNESIUM SERPL-MCNC: 2.1 MG/DL (ref 1.5–2.5)

## 2019-11-03 PROCEDURE — 83735 ASSAY OF MAGNESIUM: CPT | Mod: EDC

## 2019-11-03 PROCEDURE — 36415 COLL VENOUS BLD VENIPUNCTURE: CPT | Mod: EDC

## 2019-11-03 PROCEDURE — 99239 HOSP IP/OBS DSCHRG MGMT >30: CPT | Performed by: FAMILY MEDICINE

## 2019-11-03 PROCEDURE — 700102 HCHG RX REV CODE 250 W/ 637 OVERRIDE(OP): Mod: EDC | Performed by: FAMILY MEDICINE

## 2019-11-03 PROCEDURE — A9270 NON-COVERED ITEM OR SERVICE: HCPCS | Mod: EDC | Performed by: FAMILY MEDICINE

## 2019-11-03 PROCEDURE — 700111 HCHG RX REV CODE 636 W/ 250 OVERRIDE (IP): Mod: EDC | Performed by: HOSPITALIST

## 2019-11-03 RX ORDER — FUROSEMIDE 20 MG/1
20 TABLET ORAL 2 TIMES DAILY
Qty: 60 TAB | Refills: 0 | Status: SHIPPED | OUTPATIENT
Start: 2019-11-03 | End: 2022-04-27

## 2019-11-03 RX ADMIN — ASPIRIN 325 MG: 325 TABLET, FILM COATED ORAL at 05:28

## 2019-11-03 RX ADMIN — FUROSEMIDE 40 MG: 10 INJECTION, SOLUTION INTRAMUSCULAR; INTRAVENOUS at 05:28

## 2019-11-03 NOTE — CARE PLAN
Problem: Communication  Goal: The ability to communicate needs accurately and effectively will improve  Outcome: MET     Problem: Safety  Goal: Will remain free from injury  11/3/2019 1031 by Myles Darden R.N.  Outcome: MET  11/3/2019 1030 by Myles Darden R.N.  Outcome: PROGRESSING AS EXPECTED  Note:   Pt with call ight in reach, not a fall risk, steady gait, up self  Goal: Will remain free from falls  11/3/2019 1031 by Myles Darden R.N.  Outcome: MET  11/3/2019 1030 by Myles Darden R.N.  Outcome: PROGRESSING AS EXPECTED     Problem: Infection  Goal: Will remain free from infection  Outcome: MET     Problem: Bowel/Gastric:  Goal: Normal bowel function is maintained or improved  Outcome: MET  Goal: Will not experience complications related to bowel motility  Outcome: MET     Problem: Knowledge Deficit  Goal: Knowledge of disease process/condition, treatment plan, diagnostic tests, and medications will improve  Outcome: MET  Goal: Knowledge of the prescribed therapeutic regimen will improve  Outcome: MET     Problem: Discharge Barriers/Planning  Goal: Patient's continuum of care needs will be met  Outcome: MET     Problem: Fluid Volume:  Goal: Will maintain balanced intake and output  Outcome: MET

## 2019-11-03 NOTE — CARE PLAN
Problem: Venous Thromboembolism (VTW)/Deep Vein Thrombosis (DVT) Prevention:  Goal: Patient will participate in Venous Thrombosis (VTE)/Deep Vein Thrombosis (DVT)Prevention Measures  Outcome: NOT MET  Note:   Pt refuses heparin subq but is up walking      Problem: Safety  Goal: Will remain free from injury  Outcome: PROGRESSING AS EXPECTED  Note:   Pt with call ight in reach, not a fall risk, steady gait, up self  Goal: Will remain free from falls  Outcome: PROGRESSING AS EXPECTED

## 2019-11-03 NOTE — PROGRESS NOTES
Pt resting in bed, denies pain, VSS.  Voices desire to be discharged today, talked about waiting for MD to find out if he's ready to go.

## 2019-11-03 NOTE — DISCHARGE INSTRUCTIONS
Discharge Instructions    Discharged to home by car with relative. Discharged via wheelchair, hospital escort: Yes.  Special equipment needed: Not Applicable    Be sure to schedule a follow-up appointment with your primary care doctor or any specialists as instructed.     Discharge Plan:   Diet Plan: Discussed  Activity Level: Discussed  Confirmed Follow up Appointment: Appointment Scheduled  Confirmed Symptoms Management: Discussed  Medication Reconciliation Updated: Yes  Influenza Vaccine Indication: Indicated: 9 to 64 years of age  Influenza Vaccine Given - only chart on this line when given: Influenza Vaccine Given (See MAR)    I understand that a diet low in cholesterol, fat, and sodium is recommended for good health. Unless I have been given specific instructions below for another diet, I accept this instruction as my diet prescription.   Other diet: Cardiac low sodium    Special Instructions:   HF Patient Discharge Instructions  · Monitor your weight daily, and maintain a weight chart, to track your weight changes.   · Activity as tolerated, unless your Doctor has ordered otherwise. Other activity order: N/A.  · Follow a low fat, low cholesterol, low salt diet unless instructed otherwise by your Doctor. Read the labels on the back of food products and track your intake of fat, cholesterol and salt.   · Fluid Restriction No. If a Fluid Restriction has been ordered by your Doctor, measure fluids with a measuring cup to ensure that you are not exceeding the restriction.   · No smoking.  · Oxygen No. If your Doctor has ordered that you wear Oxygen at home, it is important to wear it as ordered.  · Did you receive an explanation from staff on the importance of taking each of your medications and why it is necessary to keep taking them unless your doctor says to stop? Yes  · Were all of your questions answered about how to manage your heart failure and what to do if you have increased signs and symptoms after you  go home? Yes  · Do you feel like your heart failure care team involved you in the care treatment plan and allowed you to make decisions regarding your care while in the hospital and addressed any discharge needs you might have? Yes    See the educational handout provided at discharge for more information on monitoring your daily weight, activity and diet. This also explains more about Heart Failure, symptoms of a flare-up and some of the tests that you have undergone.     Warning Signs of a Flare-Up include:  · Swelling in the ankles or lower legs.  · Shortness of breath, while at rest, or while doing normal activities.   · Shortness of breath at night when in bed, or coughing in bed.   · Requiring more pillows to sleep at night, or needing to sit up at night to sleep.  · Feeling weak, dizzy or fatigued.     When to call your Doctor:  · Call [facility] seven days a week from 8:00 a.m. to 8:00 p.m. for medical questions [phone].  · Call your Primary Care Physician or Cardiologist if:   1. You experience any pain radiating to your jaw or neck.  2. You have any difficulty breathing.  3. You experience weight gain of 3 lbs in a day or 5 lbs in a week.   4. You feel any palpitations or irregular heartbeats.  5. You become dizzy or lose consciousness.   If you have had an angiogram or had a pacemaker or AICD placed, and experience:  1. Bleeding, drainage or swelling at the surgical / puncture site.  2. Fever greater than 100.0 F  3. Shock from internal defibrillator.  4. Cool and / or numb extremities.      · Is patient discharged on Warfarin / Coumadin?   No     Discharge Instructions    Discharged to home by car with self. Discharged via walking, hospital escort: Refused.  Special equipment needed: Not Applicable    Be sure to schedule a follow-up appointment with your primary care doctor or any specialists as instructed.     Discharge Plan:   Diet Plan: Discussed  Activity Level: Discussed  Confirmed  "Follow up Appointment: Patient to Call and Schedule Appointment  Confirmed Symptoms Management: Discussed  Medication Reconciliation Updated: Yes  Influenza Vaccine Indication: Indicated: 9 to 64 years of age  Influenza Vaccine Given - only chart on this line when given: Influenza Vaccine Given (See MAR)    I understand that a diet low in cholesterol, fat, and sodium is recommended for good health. Unless I have been given specific instructions below for another diet, I accept this instruction as my diet prescription.   Other diet: 2gm Sodium      Low-Sodium Eating Plan  Sodium raises blood pressure and causes water to be held in the body. Getting less sodium from food will help lower your blood pressure, reduce any swelling, and protect your heart, liver, and kidneys. We get sodium by adding salt (sodium chloride) to food. Most of our sodium comes from canned, boxed, and frozen foods. Restaurant foods, fast foods, and pizza are also very high in sodium. Even if you take medicine to lower your blood pressure or to reduce fluid in your body, getting less sodium from your food is important.  What is my plan?  Most people should limit their sodium intake to 2,300 mg a day. Your health care provider recommends that you limit your sodium intake to __________ a day.  What do I need to know about this eating plan?  For the low-sodium eating plan, you will follow these general guidelines:  · Choose foods with a % Daily Value for sodium of less than 5% (as listed on the food label).  · Use salt-free seasonings or herbs instead of table salt or sea salt.  · Check with your health care provider or pharmacist before using salt substitutes.  · Eat fresh foods.  · Eat more vegetables and fruits.  · Limit canned vegetables. If you do use them, rinse them well to decrease the sodium.  · Limit cheese to 1 oz (28 g) per day.  · Eat lower-sodium products, often labeled as \"lower sodium\" or \"no salt added.\"  · Avoid foods that contain " monosodium glutamate (MSG). MSG is sometimes added to Chinese food and some canned foods.  · Check food labels (Nutrition Facts labels) on foods to learn how much sodium is in one serving.  · Eat more home-cooked food and less restaurant, buffet, and fast food.  · When eating at a restaurant, ask that your food be prepared with less salt, or no salt if possible.  How do I read food labels for sodium information?  The Nutrition Facts label lists the amount of sodium in one serving of the food. If you eat more than one serving, you must multiply the listed amount of sodium by the number of servings.  Food labels may also identify foods as:  · Sodium free--Less than 5 mg in a serving.  · Very low sodium--35 mg or less in a serving.  · Low sodium--140 mg or less in a serving.  · Light in sodium--50% less sodium in a serving. For example, if a food that usually has 300 mg of sodium is changed to become light in sodium, it will have 150 mg of sodium.  · Reduced sodium--25% less sodium in a serving. For example, if a food that usually has 400 mg of sodium is changed to reduced sodium, it will have 300 mg of sodium.  What foods can I eat?  Grains   Low-sodium cereals, including oats, puffed wheat and rice, and shredded wheat cereals. Low-sodium crackers. Unsalted rice and pasta. Lower-sodium bread.  Vegetables   Frozen or fresh vegetables. Low-sodium or reduced-sodium canned vegetables. Low-sodium or reduced-sodium tomato sauce and paste. Low-sodium or reduced-sodium tomato and vegetable juices.  Fruits   Fresh, frozen, and canned fruit. Fruit juice.  Meat and Other Protein Products   Low-sodium canned tuna and salmon. Fresh or frozen meat, poultry, seafood, and fish. Lamb. Unsalted nuts. Dried beans, peas, and lentils without added salt. Unsalted canned beans. Homemade soups without salt. Eggs.  Dairy   Milk. Soy milk. Ricotta cheese. Low-sodium or reduced-sodium cheeses. Yogurt.  Condiments   Fresh and dried herbs and  spices. Salt-free seasonings. Onion and garlic powders. Low-sodium varieties of mustard and ketchup. Fresh or refrigerated horseradish. Lemon juice.  Fats and Oils   Reduced-sodium salad dressings. Unsalted butter.  Other   Unsalted popcorn and pretzels.  The items listed above may not be a complete list of recommended foods or beverages. Contact your dietitian for more options.   What foods are not recommended?  Grains   Instant hot cereals. Bread stuffing, pancake, and biscuit mixes. Croutons. Seasoned rice or pasta mixes. Noodle soup cups. Boxed or frozen macaroni and cheese. Self-rising flour. Regular salted crackers.  Vegetables   Regular canned vegetables. Regular canned tomato sauce and paste. Regular tomato and vegetable juices. Frozen vegetables in sauces. Salted French fries. Olives. Pickles. Relishes. Sauerkraut. Salsa.  Meat and Other Protein Products   Salted, canned, smoked, spiced, or pickled meats, seafood, or fish. Nelson, ham, sausage, hot dogs, corned beef, chipped beef, and packaged luncheon meats. Salt pork. Jerky. Pickled herring. Anchovies, regular canned tuna, and sardines. Salted nuts.  Dairy   Processed cheese and cheese spreads. Cheese curds. Blue cheese and cottage cheese. Buttermilk.  Condiments   Onion and garlic salt, seasoned salt, table salt, and sea salt. Canned and packaged gravies. Worcestershire sauce. Tartar sauce. Barbecue sauce. Teriyaki sauce. Soy sauce, including reduced sodium. Steak sauce. Fish sauce. Oyster sauce. Cocktail sauce. Horseradish that you find on the shelf. Regular ketchup and mustard. Meat flavorings and tenderizers. Bouillon cubes. Hot sauce. Tabasco sauce. Marinades. Taco seasonings. Relishes.  Fats and Oils   Regular salad dressings. Salted butter. Margarine. Ghee. Nelson fat.  Other   Potato and tortilla chips. Corn chips and puffs. Salted popcorn and pretzels. Canned or dried soups. Pizza. Frozen entrees and pot pies.  The items listed above may not be a  complete list of foods and beverages to avoid. Contact your dietitian for more information.   This information is not intended to replace advice given to you by your health care provider. Make sure you discuss any questions you have with your health care provider.  Document Released: 06/09/2003 Document Revised: 05/25/2017 Document Reviewed: 10/22/2014  [employer] Patient Education © 2017 [employer]    Depression / Suicide Risk    As [facility] are [facility], it is important to learn how to keep safe from harming yourself.    Recognize the warning signs:  · Abrupt changes in personality, positive or negative- including increase in energy   · Giving away possessions  · Change in eating patterns- significant weight changes-  positive or negative  · Change in sleeping patterns- unable to sleep or sleeping all the time   · Unwillingness or inability to communicate  · Depression  · Unusual sadness, discouragement and loneliness  · Talk of wanting to die  · Neglect of personal appearance   · Rebelliousness- reckless behavior  · Withdrawal from people/activities they love  · Confusion- inability to concentrate     If you or a loved one observes any of these behaviors or has concerns about self-harm, here's what you can do:  · Talk about it- your feelings and reasons for harming yourself  · Remove any means that you might use to hurt yourself (examples: pills, rope, extension cords, firearm)  · Get professional help from the community (Mental Health, Substance Abuse, psychological counseling)  · Do not be alone:Call your Safe Contact- someone whom you trust who will be there for you.  · Call your local CRISIS HOTLINE 208-5975 or 208-166-5218  · Call your local Children's Mobile Crisis Response Team [place] (134) 954-7155 or www.[facility]  · Call the toll free National Suicide Prevention Hotlines   · National Suicide Prevention Lifeline 924-049-VGUZ (8812)  · National Hope  Line Network 800-SUICIDE (990-4099)          Depression / Suicide Risk    As you are discharged from this [facility] facility, it is important to learn how to keep safe from harming yourself.    Recognize the warning signs:  · Abrupt changes in personality, positive or negative- including increase in energy   · Giving away possessions  · Change in eating patterns- significant weight changes-  positive or negative  · Change in sleeping patterns- unable to sleep or sleeping all the time   · Unwillingness or inability to communicate  · Depression  · Unusual sadness, discouragement and loneliness  · Talk of wanting to die  · Neglect of personal appearance   · Rebelliousness- reckless behavior  · Withdrawal from people/activities they love  · Confusion- inability to concentrate     If you or a loved one observes any of these behaviors or has concerns about self-harm, here's what you can do:  · Talk about it- your feelings and reasons for harming yourself  · Remove any means that you might use to hurt yourself (examples: pills, rope, extension cords, firearm)  · Get professional help from the community (Mental Health, Substance Abuse, psychological counseling)  · Do not be alone:Call your Safe Contact- someone whom you trust who will be there for you.  · Call your local CRISIS HOTLINE 509-6929 or 771-245-5306  · Call your local Children's Mobile Crisis Response Team [place] (782) 614-9092 or www.[employer]  · Call the toll free National Suicide Prevention Hotlines   · National Suicide Prevention Lifeline 304-531-AXTX (8440)  · National [place] Line Network 800-SUICIDE (043-6859)

## 2019-11-03 NOTE — DISCHARGE SUMMARY
Discharge Summary    CHIEF COMPLAINT ON ADMISSION  Chief Complaint   Patient presents with   • Peripheral Edema   • Shortness of Breath       Reason for Admission  Leg Swelling     Admission Date  10/29/2019    CODE STATUS  Prior    HPI & HOSPITAL COURSE  This is a 39 y.o. male here with systolic heart failure and cardiomyopathy as well as acute kidney injury.  On echocardiogram his ejection fraction was noted to be 20%.  Cardiology was consulted on the case.  Patient denies any methamphetamine use states that his last use was 6 months ago but states urine drug screen was positive.  Patient was diuresed with IV Lasix with improvement of his symptoms.  His creatinine was also been stable.  Unable to place an beta-blocker secondary to his multivitamin use.  Unable to place him on ACE inhibitor secondary to any acute kidney injury.  Lengthy counseling was done regards to his methamphetamine use.  Aloe up with cardiology as outpatient.  His electrolyte deficiencies were replaced.       Therefore, he is discharged in good and stable condition to home with close outpatient follow-up.    The patient met 2-midnight criteria for an inpatient stay at the time of discharge.    Discharge Date  11/3/2019    FOLLOW UP ITEMS POST DISCHARGE  Follow-up with cardiology    DISCHARGE DIAGNOSES  Principal Problem:    Acute systolic heart failure (HCC) POA: Yes  Active Problems:    Cardiomyopathy (HCC) POA: Yes    CHRIS (acute kidney injury) (HCC) POA: Yes    Elevated LFTs POA: Yes    Hyperglycemia POA: Yes    Hypokalemia (Chronic) POA: Yes    Hypomagnesemia POA: Yes    Elevated TSH POA: Yes    Elevated troponin POA: Yes    Essential hypertension POA: Yes    Nonsustained ventricular tachycardia (HCC) POA: Yes    Class 1 obesity due to excess calories without serious comorbidity with body mass index (BMI) of 31.0 to 31.9 in adult POA: Yes  Resolved Problems:    * No resolved hospital problems. *      FOLLOW UP  Future Appointments   Date  Time Provider Department Center   11/6/2019  9:00 AM DEVI Donnelly [facility] None     [address] [address] [phone]    Please call to schedule your appointment to establish with a Primary Care Physician. They do have a sliding fee scale based on income. Thank you      MEDICATIONS ON DISCHARGE     Medication List      START taking these medications      Instructions   aspirin EC 81 MG Tbec  Commonly known as:  ECOTRIN   Take 1 Tab by mouth every day.  Dose:  81 mg     furosemide 20 MG Tabs  Commonly known as:  LASIX   Take 1 Tab by mouth 2 times a day.  Dose:  20 mg            Allergies  No Known Allergies    DIET  No orders of the defined types were placed in this encounter.      ACTIVITY  As tolerated.  Weight bearing as tolerated    CONSULTATIONS  Cardiology - [place]    PROCEDURES  None    LABORATORY  Lab Results   Component Value Date    SODIUM 135 11/02/2019    POTASSIUM 4.7 11/02/2019    CHLORIDE 101 11/02/2019    CO2 21 11/02/2019    GLUCOSE 201 (H) 11/02/2019    BUN 31 (H) 11/02/2019    CREATININE 1.37 11/02/2019        Lab Results   Component Value Date    WBC 8.7 11/01/2019    HEMOGLOBIN 16.6 11/01/2019    HEMATOCRIT 50.5 11/01/2019    PLATELETCT 260 11/01/2019        Total time of the discharge process exceeds 45 minutes.

## 2019-11-03 NOTE — PROGRESS NOTES
Pt discharged to home.  Refused wheelchair/escort.  Discharge packet and teaching given, follow up appt schedule pink sheet also completed.  Pt AOx4

## 2019-11-04 ENCOUNTER — TELEPHONE (OUTPATIENT)
Dept: CARDIOLOGY | Facility: MEDICAL CENTER | Age: 39
End: 2019-11-04

## 2019-11-04 NOTE — TELEPHONE ENCOUNTER
I s/w patient and asked if he had a previous cardiologist. Patient stated no and that his appt with LEROY Donnelly on 11/06 will be his initial visit with a Cardiologist.     Patient thanked me for the call

## 2019-11-07 ENCOUNTER — OFFICE VISIT (OUTPATIENT)
Dept: CARDIOLOGY | Facility: MEDICAL CENTER | Age: 39
End: 2019-11-07

## 2019-11-07 VITALS
OXYGEN SATURATION: 97 % | BODY MASS INDEX: 28.95 KG/M2 | HEIGHT: 68 IN | HEART RATE: 108 BPM | WEIGHT: 191 LBS | DIASTOLIC BLOOD PRESSURE: 78 MMHG | SYSTOLIC BLOOD PRESSURE: 104 MMHG

## 2019-11-07 DIAGNOSIS — R79.89 ELEVATED LFTS: ICD-10-CM

## 2019-11-07 DIAGNOSIS — I47.29 NONSUSTAINED VENTRICULAR TACHYCARDIA (HCC): ICD-10-CM

## 2019-11-07 DIAGNOSIS — I50.20 ACC/AHA STAGE C SYSTOLIC CONGESTIVE HEART FAILURE (HCC): ICD-10-CM

## 2019-11-07 DIAGNOSIS — R79.89 ELEVATED TSH: ICD-10-CM

## 2019-11-07 DIAGNOSIS — I50.21 ACUTE SYSTOLIC HEART FAILURE (HCC): ICD-10-CM

## 2019-11-07 DIAGNOSIS — I10 ESSENTIAL HYPERTENSION: ICD-10-CM

## 2019-11-07 DIAGNOSIS — R79.89 ELEVATED TROPONIN: ICD-10-CM

## 2019-11-07 DIAGNOSIS — I42.9 CARDIOMYOPATHY, UNSPECIFIED TYPE (HCC): ICD-10-CM

## 2019-11-07 PROCEDURE — 99204 OFFICE O/P NEW MOD 45 MIN: CPT | Performed by: INTERNAL MEDICINE

## 2019-11-07 RX ORDER — DIGOXIN 125 MCG
125 TABLET ORAL DAILY
Qty: 30 TAB | Refills: 11 | Status: SHIPPED | OUTPATIENT
Start: 2019-11-07 | End: 2021-09-15

## 2019-11-07 RX ORDER — CARVEDILOL 3.12 MG/1
3.12 TABLET ORAL 2 TIMES DAILY WITH MEALS
Qty: 60 TAB | Refills: 11 | Status: SHIPPED | OUTPATIENT
Start: 2019-11-07 | End: 2021-09-15

## 2019-11-07 ASSESSMENT — MINNESOTA LIVING WITH HEART FAILURE QUESTIONNAIRE (MLHF)
MAKING YOU SHORT OF BREATH: 4
SWELLING IN ANKLES OR LEGS: 4
EATING LESS FOODS YOU LIKE: 4
MAKING YOU STAY IN A HOSPITAL: 4
TIRED, FATIGUED OR LOW ON ENERGY: 4
DIFFICULTY SOCIALIZING WITH FAMILY OR FRIENDS: 4
COSTING YOU MONEY FOR MEDICAL CARE: 5
GIVING YOU SIDE EFFECTS FROM TREATMENTS: 4
HAVING TO SIT OR LIE DOWN DURING THE DAY: 4
LOSS OF SELF CONTROL IN YOUR LIFE: 4
MAKING YOU WORRY: 4
DIFFICULTY TO CONCENTRATE OR REMEMBERING THINGS: 4
WORKING AROUND THE HOUSE OR YARD DIFFICULT: 4
DIFFICULTY WORKING TO EARN A LIVING: 4
WALKING ABOUT OR CLIMBING STAIRS DIFFICULT: 4
DIFFICULTY WITH SEXUAL ACTIVITIES: 4
DIFFICULTY SLEEPING WELL AT NIGHT: 4
DIFFICULTY WITH RECREATIONAL PASTIMES, SPORTS, HOBBIES: 4
TOTAL_SCORE: 85
DIFFICULTY GOING AWAY FROM HOME: 3
MAKING YOU FEEL DEPRESSED: 5
FEELING LIKE A BURDEN TO FAMILY AND FRIENDS: 4

## 2019-11-07 ASSESSMENT — ENCOUNTER SYMPTOMS
SPUTUM PRODUCTION: 0
NEUROLOGICAL NEGATIVE: 1
WHEEZING: 0
CHILLS: 0
COUGH: 0
LOSS OF CONSCIOUSNESS: 0
SORE THROAT: 0
GASTROINTESTINAL NEGATIVE: 1
PND: 0
DIZZINESS: 0
BRUISES/BLEEDS EASILY: 0
ORTHOPNEA: 0
HEMOPTYSIS: 0
WEAKNESS: 0
SHORTNESS OF BREATH: 0
PALPITATIONS: 0
EYES NEGATIVE: 1
FEVER: 0
CLAUDICATION: 0
RESPIRATORY NEGATIVE: 1
STRIDOR: 0
MUSCULOSKELETAL NEGATIVE: 1
CARDIOVASCULAR NEGATIVE: 1

## 2019-11-07 ASSESSMENT — 6 MINUTE WALK TEST (6MWT): TOTAL DISTANCE WALKED (METERS): 110

## 2019-11-07 NOTE — PROGRESS NOTES
Chief Complaint   Patient presents with   • Congestive Heart Failure     NEW HEART FAILURE       Subjective:   Fariha Ortega is a 39 y.o. male who presents today as a new consultation for heart failure with reduced ejection fraction.  He is a 39-year-old male with a past medical history of alcohol and methamphetamine abuse.  He was admitted to the hospital for this and started on diuretics.    History reviewed. No pertinent past medical history.  History reviewed. No pertinent surgical history.  Family History   Problem Relation Age of Onset   • Heart Disease Mother    • Hypertension Mother    • Cancer Father      Social History     Socioeconomic History   • Marital status: Single     Spouse name: Not on file   • Number of children: Not on file   • Years of education: Not on file   • Highest education level: Not on file   Occupational History   • Not on file   Social Needs   • Financial resource strain: Not on file   • Food insecurity:     Worry: Not on file     Inability: Not on file   • Transportation needs:     Medical: Not on file     Non-medical: Not on file   Tobacco Use   • Smoking status: Former Smoker     Packs/day: 0.00     Last attempt to quit: 11/7/2016     Years since quitting: 3.0   • Smokeless tobacco: Never Used   Substance and Sexual Activity   • Alcohol use: Yes     Alcohol/week: 9.0 oz     Types: 15 Cans of beer per week     Frequency: 2-4 times a month     Drinks per session: 10 or more     Binge frequency: Monthly     Comment: occ   • Drug use: Not Currently     Comment: meth use last several weeks    • Sexual activity: Not on file   Lifestyle   • Physical activity:     Days per week: Not on file     Minutes per session: Not on file   • Stress: Not on file   Relationships   • Social connections:     Talks on phone: Not on file     Gets together: Not on file     Attends Faith service: Not on file     Active member of club or organization: Not on file     Attends meetings of clubs or  "organizations: Not on file     Relationship status: Not on file   • Intimate partner violence:     Fear of current or ex partner: Not on file     Emotionally abused: Not on file     Physically abused: Not on file     Forced sexual activity: Not on file   Other Topics Concern   • Not on file   Social History Narrative   • Not on file     No Known Allergies  Outpatient Encounter Medications as of 11/7/2019   Medication Sig Dispense Refill   • digoxin (LANOXIN) 125 MCG Tab Take 1 Tab by mouth every day. 30 Tab 11   • carvedilol (COREG) 3.125 MG Tab Take 1 Tab by mouth 2 times a day, with meals. 60 Tab 11   • aspirin EC (ECOTRIN) 81 MG Tablet Delayed Response Take 1 Tab by mouth every day. 100 Tab 0   • furosemide (LASIX) 20 MG Tab Take 1 Tab by mouth 2 times a day. 60 Tab 0     No facility-administered encounter medications on file as of 11/7/2019.      Review of Systems   Constitutional: Positive for malaise/fatigue. Negative for chills and fever.   HENT: Negative.  Negative for sore throat.    Eyes: Negative.    Respiratory: Negative.  Negative for cough, hemoptysis, sputum production, shortness of breath, wheezing and stridor.    Cardiovascular: Negative.  Negative for chest pain, palpitations, orthopnea, claudication, leg swelling and PND.   Gastrointestinal: Negative.    Genitourinary: Negative.    Musculoskeletal: Negative.    Skin: Negative.    Neurological: Negative.  Negative for dizziness, loss of consciousness and weakness.   Endo/Heme/Allergies: Negative.  Does not bruise/bleed easily.   All other systems reviewed and are negative.       Objective:   /78 (BP Location: Left arm, Patient Position: Sitting, BP Cuff Size: Adult)   Pulse (!) 108   Ht 1.727 m (5' 8\")   Wt 86.6 kg (191 lb)   SpO2 97%   BMI 29.04 kg/m²     Physical Exam   Constitutional: He appears well-developed and well-nourished. No distress.   HENT:   Head: Normocephalic and atraumatic.   Right Ear: External ear normal.   Left Ear: " External ear normal.   Nose: Nose normal.   Mouth/Throat: No oropharyngeal exudate.   Eyes: Pupils are equal, round, and reactive to light. Conjunctivae and EOM are normal. Right eye exhibits no discharge. Left eye exhibits no discharge. No scleral icterus.   Neck: Neck supple. No JVD present.   Cardiovascular: Normal rate, regular rhythm and intact distal pulses. Exam reveals no gallop and no friction rub.   No murmur heard.  Pulmonary/Chest: Effort normal. No stridor. No respiratory distress. He has no wheezes. He has no rales. He exhibits no tenderness.   Abdominal: Soft. He exhibits no distension. There is no guarding.   Musculoskeletal: Normal range of motion.         General: No tenderness, deformity or edema.   Neurological: He is alert. He has normal reflexes. He displays normal reflexes. No cranial nerve deficit. He exhibits normal muscle tone. Coordination normal.   Skin: Skin is warm and dry. No rash noted. He is not diaphoretic. No erythema. No pallor.   Psychiatric: He has a normal mood and affect. His behavior is normal. Judgment and thought content normal.   Nursing note and vitals reviewed.      Assessment:     1. Acute systolic heart failure (HCC)  carvedilol (COREG) 3.125 MG Tab   2. Cardiomyopathy, unspecified type (HCC)  digoxin (LANOXIN) 125 MCG Tab    carvedilol (COREG) 3.125 MG Tab   3. Elevated troponin     4. Elevated LFTs     5. Elevated TSH     6. Essential hypertension     7. Nonsustained ventricular tachycardia (HCC)  digoxin (LANOXIN) 125 MCG Tab    carvedilol (COREG) 3.125 MG Tab   8. ACC/AHA stage C systolic congestive heart failure (HCC)         Medical Decision Making:  Today's Assessment / Status / Plan:     39-year-old male with heart failure with reduced ejection fraction from a combination of likely alcohol and methamphetamine abuse.  I would like to get his heart rate slowed down a little bit we will start him on digoxin as well as low-dose carvedilol.  He will work on getting  insurance.  We will see him back in the office in 4 weeks.  He will call us if he has any issues with his medications.

## 2021-09-15 DIAGNOSIS — I50.21 ACUTE SYSTOLIC HEART FAILURE (HCC): ICD-10-CM

## 2021-09-15 DIAGNOSIS — I47.29 NONSUSTAINED VENTRICULAR TACHYCARDIA (HCC): ICD-10-CM

## 2021-09-15 DIAGNOSIS — I42.9 CARDIOMYOPATHY, UNSPECIFIED TYPE (HCC): ICD-10-CM

## 2021-09-16 ENCOUNTER — TELEPHONE (OUTPATIENT)
Dept: CARDIOLOGY | Facility: MEDICAL CENTER | Age: 41
End: 2021-09-16

## 2021-09-16 RX ORDER — DIGOXIN 125 MCG
TABLET ORAL
Qty: 90 TABLET | Refills: 0 | Status: SHIPPED | OUTPATIENT
Start: 2021-09-16 | End: 2022-01-31

## 2021-09-16 RX ORDER — CARVEDILOL 3.12 MG/1
TABLET ORAL
Qty: 180 TABLET | Refills: 0 | Status: SHIPPED | OUTPATIENT
Start: 2021-09-16 | End: 2022-01-31

## 2022-04-12 DIAGNOSIS — I50.21 ACUTE SYSTOLIC HEART FAILURE (HCC): ICD-10-CM

## 2022-04-12 DIAGNOSIS — I42.9 CARDIOMYOPATHY, UNSPECIFIED TYPE (HCC): ICD-10-CM

## 2022-04-12 DIAGNOSIS — I47.29 NONSUSTAINED VENTRICULAR TACHYCARDIA (HCC): ICD-10-CM

## 2022-04-12 RX ORDER — CARVEDILOL 3.12 MG/1
3.12 TABLET ORAL 2 TIMES DAILY
Qty: 60 TABLET | Refills: 0 | Status: SHIPPED | OUTPATIENT
Start: 2022-04-12 | End: 2022-04-27 | Stop reason: SDUPTHER

## 2022-04-12 RX ORDER — DIGOXIN 125 MCG
125 TABLET ORAL DAILY
Qty: 30 TABLET | Refills: 0 | Status: SHIPPED | OUTPATIENT
Start: 2022-04-12 | End: 2022-04-27

## 2022-04-12 NOTE — TELEPHONE ENCOUNTER
RO    Patient called and states that he is completely out of his medications. Patient would like refills to go to the pharmacy on file. Please advise.     Thank you,  Bro MUNGUIA

## 2022-04-27 ENCOUNTER — OFFICE VISIT (OUTPATIENT)
Dept: CARDIOLOGY | Facility: MEDICAL CENTER | Age: 42
End: 2022-04-27
Payer: COMMERCIAL

## 2022-04-27 VITALS
DIASTOLIC BLOOD PRESSURE: 80 MMHG | HEIGHT: 69 IN | SYSTOLIC BLOOD PRESSURE: 128 MMHG | OXYGEN SATURATION: 98 % | BODY MASS INDEX: 31.84 KG/M2 | WEIGHT: 215 LBS | HEART RATE: 77 BPM | RESPIRATION RATE: 16 BRPM

## 2022-04-27 DIAGNOSIS — Z79.899 HIGH RISK MEDICATION USE: ICD-10-CM

## 2022-04-27 DIAGNOSIS — I50.21 ACUTE SYSTOLIC HEART FAILURE (HCC): ICD-10-CM

## 2022-04-27 DIAGNOSIS — I51.89 LEFT VENTRICULAR SYSTOLIC DYSFUNCTION, NYHA CLASS 1: ICD-10-CM

## 2022-04-27 DIAGNOSIS — I47.29 NONSUSTAINED VENTRICULAR TACHYCARDIA (HCC): ICD-10-CM

## 2022-04-27 DIAGNOSIS — I10 ESSENTIAL HYPERTENSION: ICD-10-CM

## 2022-04-27 DIAGNOSIS — I50.20 ACC/AHA STAGE C SYSTOLIC HEART FAILURE (HCC): ICD-10-CM

## 2022-04-27 DIAGNOSIS — I42.9 CARDIOMYOPATHY, UNSPECIFIED TYPE (HCC): ICD-10-CM

## 2022-04-27 LAB — EKG IMPRESSION: NORMAL

## 2022-04-27 PROCEDURE — 93000 ELECTROCARDIOGRAM COMPLETE: CPT | Performed by: INTERNAL MEDICINE

## 2022-04-27 PROCEDURE — 99214 OFFICE O/P EST MOD 30 MIN: CPT | Performed by: NURSE PRACTITIONER

## 2022-04-27 RX ORDER — CARVEDILOL 3.12 MG/1
3.12 TABLET ORAL 2 TIMES DAILY
Qty: 180 TABLET | Refills: 3 | Status: SHIPPED | OUTPATIENT
Start: 2022-04-27

## 2022-04-27 RX ORDER — LISINOPRIL 2.5 MG/1
2.5 TABLET ORAL DAILY
Qty: 90 TABLET | Refills: 3 | Status: SHIPPED | OUTPATIENT
Start: 2022-04-27

## 2022-04-27 ASSESSMENT — ENCOUNTER SYMPTOMS
MYALGIAS: 0
PND: 0
ORTHOPNEA: 0
FALLS: 0
LOSS OF CONSCIOUSNESS: 0
PALPITATIONS: 0
COUGH: 0
BLURRED VISION: 0
CLAUDICATION: 0
ABDOMINAL PAIN: 0
FEVER: 0
TINGLING: 0
VOMITING: 0
BRUISES/BLEEDS EASILY: 0
NAUSEA: 0
WEIGHT LOSS: 0
BLOOD IN STOOL: 0
SHORTNESS OF BREATH: 0
DIZZINESS: 0

## 2022-04-27 NOTE — PROGRESS NOTES
Chief Complaint   Patient presents with   • Hypertension   • Congestive Heart Failure     F/V Dx: ACC/AHA stage C systolic congestive heart failure (HCC)       Subjective     Fariha Ortega is a 41 y.o. male who presents today heart failure follow-up.  Patient was last seen by Dr. Adan on 11/7/2019.  In addition, patient has past medical history of alcohol and methamphetamine abuse.  Patient was last hospitalized 10/29/2019.  Patient was lost to follow-up due to occupation.  Patient here for medication refills today.    Today, patient feels well, denies chest pain, shortness of breath, palpitations, dizziness/lightheadedness, orthopnea, PND or Edema.  Patient reports tolerates physical activity well with his children, denies CASTELLON.  Patient reports no drug use since last hospitalization.    Based on physical examination findings, patient is euvolemic. No JVD, lungs are clear to auscultation, no pitting edema in bilateral lower extremities, no ascites. Dry weight is 215 lbs.    EKG in office personally interpreted by me as sinus rhythm    We will refill patient's carvedilol and add lisinopril to patient's HF OMT today.  We will obtain updated echocardiogram.  We will check lab work for high risk medication use and to trend BNP.  Education provided regarding importance of medication compliance, not stopping medication therapy despite possible improved EF.  Patient verbalizes understanding and agreeable to plan of care.  Patient to follow-up after echocardiogram to discuss results.      History reviewed. No pertinent past medical history.  History reviewed. No pertinent surgical history.  Family History   Problem Relation Age of Onset   • Heart Disease Mother    • Hypertension Mother    • Cancer Father      Social History     Socioeconomic History   • Marital status: Single     Spouse name: Not on file   • Number of children: Not on file   • Years of education: Not on file   • Highest education level: Not on file    Occupational History   • Not on file   Tobacco Use   • Smoking status: Former Smoker     Packs/day: 0.00     Quit date: 2016     Years since quittin.4   • Smokeless tobacco: Never Used   Vaping Use   • Vaping Use: Former   Substance and Sexual Activity   • Alcohol use: Yes     Alcohol/week: 9.0 oz     Types: 15 Cans of beer per week     Comment: occ   • Drug use: Not Currently     Comment: meth use last several weeks    • Sexual activity: Not on file   Other Topics Concern   • Not on file   Social History Narrative   • Not on file     Social Determinants of Health     Financial Resource Strain: Not on file   Food Insecurity: Not on file   Transportation Needs: Not on file   Physical Activity: Not on file   Stress: Not on file   Social Connections: Not on file   Intimate Partner Violence: Not on file   Housing Stability: Not on file     No Known Allergies  Outpatient Encounter Medications as of 2022   Medication Sig Dispense Refill   • carvedilol (COREG) 3.125 MG Tab Take 1 Tablet by mouth 2 times a day. Must complete follow up for future refills. 180 Tablet 3   • lisinopril (PRINIVIL) 2.5 MG Tab Take 1 Tablet by mouth every day. 90 Tablet 3   • [DISCONTINUED] digoxin (LANOXIN) 125 MCG Tab Take 1 Tablet by mouth every day. Must complete follow up for future refills. 30 Tablet 0   • [DISCONTINUED] carvedilol (COREG) 3.125 MG Tab Take 1 Tablet by mouth 2 times a day. Must complete follow up for future refills. 60 Tablet 0   • [DISCONTINUED] aspirin EC (ECOTRIN) 81 MG Tablet Delayed Response Take 1 Tab by mouth every day. (Patient not taking: Reported on 2022) 100 Tab 0   • [DISCONTINUED] furosemide (LASIX) 20 MG Tab Take 1 Tab by mouth 2 times a day. (Patient not taking: Reported on 2022) 60 Tab 0     No facility-administered encounter medications on file as of 2022.     Review of Systems   Constitutional: Negative for fever, malaise/fatigue and weight loss.   Eyes: Negative for blurred  "vision.   Respiratory: Negative for cough and shortness of breath.    Cardiovascular: Negative for chest pain, palpitations, orthopnea, claudication, leg swelling and PND.   Gastrointestinal: Negative for abdominal pain, blood in stool, nausea and vomiting.   Genitourinary: Negative for dysuria, frequency and hematuria.   Musculoskeletal: Negative for falls and myalgias.   Neurological: Negative for dizziness, tingling and loss of consciousness.   Endo/Heme/Allergies: Does not bruise/bleed easily.              Objective     /80 (BP Location: Left arm, Patient Position: Sitting, BP Cuff Size: Adult)   Pulse 77   Resp 16   Ht 1.753 m (5' 9\")   Wt 97.5 kg (215 lb)   SpO2 98%   BMI 31.75 kg/m²     Physical Exam  Vitals reviewed.   Constitutional:       Appearance: He is well-developed.   HENT:      Head: Normocephalic and atraumatic.   Eyes:      Pupils: Pupils are equal, round, and reactive to light.   Neck:      Vascular: No JVD.   Cardiovascular:      Rate and Rhythm: Normal rate and regular rhythm.      Heart sounds: Normal heart sounds. No murmur heard.    No friction rub. No gallop.   Pulmonary:      Effort: Pulmonary effort is normal. No respiratory distress.      Breath sounds: Normal breath sounds.   Abdominal:      General: Bowel sounds are normal. There is no distension.      Palpations: Abdomen is soft.   Musculoskeletal:      Right lower leg: No edema.      Left lower leg: No edema.   Skin:     General: Skin is warm and dry.      Findings: No erythema.   Neurological:      General: No focal deficit present.      Mental Status: He is alert and oriented to person, place, and time.   Psychiatric:         Behavior: Behavior normal.       Lab Results   Component Value Date/Time    CHOLSTRLTOT 117 11/01/2019 04:06 AM    LDL 43 11/01/2019 04:06 AM    HDL 35 (A) 11/01/2019 04:06 AM    TRIGLYCERIDE 196 (H) 11/01/2019 04:06 AM       Lab Results   Component Value Date/Time    SODIUM 135 11/02/2019 08:36 AM "    POTASSIUM 4.7 11/02/2019 08:36 AM    CHLORIDE 101 11/02/2019 08:36 AM    CO2 21 11/02/2019 08:36 AM    GLUCOSE 201 (H) 11/02/2019 08:36 AM    BUN 31 (H) 11/02/2019 08:36 AM    CREATININE 1.37 11/02/2019 08:36 AM     Lab Results   Component Value Date/Time    ALKPHOSPHAT 90 11/01/2019 04:06 AM    ASTSGOT 103 (H) 11/01/2019 04:06 AM    ALTSGPT 138 (H) 11/01/2019 04:06 AM    TBILIRUBIN 1.2 11/01/2019 04:06 AM      Transthoracic echocardiogram (10/30/2019): No prior study is available for comparison.   Severely reduced left ventricular systolic function.  Left ventricular ejection fraction is visually estimated to be <20%.  Global hypokinesis.  Grade I diastolic dysfunction.  Reduced right ventricular systolic function.  Mild mitral regurgitation.  Moderate tricuspid regurgitation.  Right ventricular systolic pressure is estimated to be 70 mmHg.           Assessment & Plan     1. Essential hypertension  EKG    Basic Metabolic Panel    proBrain Natriuretic Peptide, NT    EC-ECHOCARDIOGRAM COMPLETE W/O CONT   2. Acute systolic heart failure (HCC)  carvedilol (COREG) 3.125 MG Tab    Basic Metabolic Panel    proBrain Natriuretic Peptide, NT    EC-ECHOCARDIOGRAM COMPLETE W/O CONT   3. Cardiomyopathy, unspecified type (HCC)  carvedilol (COREG) 3.125 MG Tab    Basic Metabolic Panel    proBrain Natriuretic Peptide, NT    EC-ECHOCARDIOGRAM COMPLETE W/O CONT   4. Nonsustained ventricular tachycardia (HCC)  carvedilol (COREG) 3.125 MG Tab    Basic Metabolic Panel    proBrain Natriuretic Peptide, NT    EC-ECHOCARDIOGRAM COMPLETE W/O CONT   5. ACC/AHA stage C systolic heart failure (HCC)     6. High risk medication use     7. 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   -Reinforced s/sx of worsening heart failure with patient and weight monitoring. Pt verbalizes understanding. Pt to call office if present.      FU in clinic in 3 months. Sooner if needed.    Patient verbalizes understanding and agrees with the plan of care.     JERRY Romano.   [facility]  [phone]    PLEASE NOTE: This Note was created using voice recognition Software. I have made every reasonable attempt to correct obvious errors, but I expect that there are errors of grammar and possibly content that I did not discover before finalizing the note

## 2022-07-28 ENCOUNTER — APPOINTMENT (OUTPATIENT)
Dept: CARDIOLOGY | Facility: MEDICAL CENTER | Age: 42
End: 2022-07-28
Attending: INTERNAL MEDICINE
Payer: COMMERCIAL

## 2022-11-10 ENCOUNTER — APPOINTMENT (OUTPATIENT)
Dept: CARDIOLOGY | Facility: MEDICAL CENTER | Age: 42
End: 2022-11-10
Attending: INTERNAL MEDICINE
Payer: COMMERCIAL

## 2023-01-11 ENCOUNTER — APPOINTMENT (OUTPATIENT)
Dept: CARDIOLOGY | Facility: MEDICAL CENTER | Age: 43
End: 2023-01-11
Attending: INTERNAL MEDICINE
Payer: COMMERCIAL

## 2023-04-17 ENCOUNTER — HOSPITAL ENCOUNTER (OUTPATIENT)
Dept: CARDIOLOGY | Facility: MEDICAL CENTER | Age: 43
End: 2023-04-17
Attending: INTERNAL MEDICINE
Payer: COMMERCIAL

## 2024-07-13 ENCOUNTER — APPOINTMENT (OUTPATIENT)
Dept: RADIOLOGY | Facility: MEDICAL CENTER | Age: 44
DRG: 291 | End: 2024-07-13
Attending: EMERGENCY MEDICINE
Payer: COMMERCIAL

## 2024-07-13 ENCOUNTER — HOSPITAL ENCOUNTER (INPATIENT)
Facility: MEDICAL CENTER | Age: 44
LOS: 2 days | DRG: 291 | End: 2024-07-15
Attending: EMERGENCY MEDICINE | Admitting: STUDENT IN AN ORGANIZED HEALTH CARE EDUCATION/TRAINING PROGRAM
Payer: COMMERCIAL

## 2024-07-13 DIAGNOSIS — I50.21 ACUTE SYSTOLIC HEART FAILURE (HCC): ICD-10-CM

## 2024-07-13 DIAGNOSIS — I47.29 NONSUSTAINED VENTRICULAR TACHYCARDIA (HCC): ICD-10-CM

## 2024-07-13 DIAGNOSIS — R07.9 CHEST PAIN, UNSPECIFIED TYPE: ICD-10-CM

## 2024-07-13 DIAGNOSIS — R60.0 PERIPHERAL EDEMA: ICD-10-CM

## 2024-07-13 DIAGNOSIS — I42.9 CARDIOMYOPATHY, UNSPECIFIED TYPE (HCC): ICD-10-CM

## 2024-07-13 PROBLEM — I50.23 ACUTE ON CHRONIC SYSTOLIC CHF (CONGESTIVE HEART FAILURE) (HCC): Status: ACTIVE | Noted: 2024-07-13

## 2024-07-13 LAB
ALBUMIN SERPL BCP-MCNC: 3.7 G/DL (ref 3.2–4.9)
ALBUMIN/GLOB SERPL: 1.4 G/DL
ALP SERPL-CCNC: 74 U/L (ref 30–99)
ALT SERPL-CCNC: 37 U/L (ref 2–50)
ANION GAP SERPL CALC-SCNC: 13 MMOL/L (ref 7–16)
AST SERPL-CCNC: 52 U/L (ref 12–45)
BASOPHILS # BLD AUTO: 0.8 % (ref 0–1.8)
BASOPHILS # BLD: 0.06 K/UL (ref 0–0.12)
BILIRUB SERPL-MCNC: 1.4 MG/DL (ref 0.1–1.5)
BUN SERPL-MCNC: 16 MG/DL (ref 8–22)
CALCIUM ALBUM COR SERPL-MCNC: 9.4 MG/DL (ref 8.5–10.5)
CALCIUM SERPL-MCNC: 9.2 MG/DL (ref 8.5–10.5)
CHLORIDE SERPL-SCNC: 102 MMOL/L (ref 96–112)
CO2 SERPL-SCNC: 21 MMOL/L (ref 20–33)
CREAT SERPL-MCNC: 1.37 MG/DL (ref 0.5–1.4)
EKG IMPRESSION: NORMAL
EOSINOPHIL # BLD AUTO: 0.16 K/UL (ref 0–0.51)
EOSINOPHIL NFR BLD: 2.2 % (ref 0–6.9)
ERYTHROCYTE [DISTWIDTH] IN BLOOD BY AUTOMATED COUNT: 50.4 FL (ref 35.9–50)
GFR SERPLBLD CREATININE-BSD FMLA CKD-EPI: 66 ML/MIN/1.73 M 2
GLOBULIN SER CALC-MCNC: 2.6 G/DL (ref 1.9–3.5)
GLUCOSE SERPL-MCNC: 102 MG/DL (ref 65–99)
HCG SERPL QL: NEGATIVE
HCT VFR BLD AUTO: 63.2 % (ref 42–52)
HGB BLD-MCNC: 21.2 G/DL (ref 14–18)
IMM GRANULOCYTES # BLD AUTO: 0.01 K/UL (ref 0–0.11)
IMM GRANULOCYTES NFR BLD AUTO: 0.1 % (ref 0–0.9)
LYMPHOCYTES # BLD AUTO: 2.99 K/UL (ref 1–4.8)
LYMPHOCYTES NFR BLD: 41.9 % (ref 22–41)
MAGNESIUM SERPL-MCNC: 1.8 MG/DL (ref 1.5–2.5)
MCH RBC QN AUTO: 32.9 PG (ref 27–33)
MCHC RBC AUTO-ENTMCNC: 33.5 G/DL (ref 32.3–36.5)
MCV RBC AUTO: 98.1 FL (ref 81.4–97.8)
MONOCYTES # BLD AUTO: 0.6 K/UL (ref 0–0.85)
MONOCYTES NFR BLD AUTO: 8.4 % (ref 0–13.4)
NEUTROPHILS # BLD AUTO: 3.31 K/UL (ref 1.82–7.42)
NEUTROPHILS NFR BLD: 46.6 % (ref 44–72)
NRBC # BLD AUTO: 0 K/UL
NRBC BLD-RTO: 0 /100 WBC (ref 0–0.2)
NT-PROBNP SERPL IA-MCNC: 3222 PG/ML (ref 0–125)
PLATELET # BLD AUTO: 237 K/UL (ref 164–446)
PMV BLD AUTO: 9.3 FL (ref 9–12.9)
POTASSIUM SERPL-SCNC: 4.8 MMOL/L (ref 3.6–5.5)
PROT SERPL-MCNC: 6.3 G/DL (ref 6–8.2)
RBC # BLD AUTO: 6.44 M/UL (ref 4.7–6.1)
SODIUM SERPL-SCNC: 136 MMOL/L (ref 135–145)
TROPONIN T SERPL-MCNC: 73 NG/L (ref 6–19)
WBC # BLD AUTO: 7.1 K/UL (ref 4.8–10.8)

## 2024-07-13 PROCEDURE — 770020 HCHG ROOM/CARE - TELE (206)

## 2024-07-13 PROCEDURE — 36415 COLL VENOUS BLD VENIPUNCTURE: CPT

## 2024-07-13 PROCEDURE — 83880 ASSAY OF NATRIURETIC PEPTIDE: CPT

## 2024-07-13 PROCEDURE — 96374 THER/PROPH/DIAG INJ IV PUSH: CPT

## 2024-07-13 PROCEDURE — 96376 TX/PRO/DX INJ SAME DRUG ADON: CPT

## 2024-07-13 PROCEDURE — 85025 COMPLETE CBC W/AUTO DIFF WBC: CPT

## 2024-07-13 PROCEDURE — 80307 DRUG TEST PRSMV CHEM ANLYZR: CPT

## 2024-07-13 PROCEDURE — 83735 ASSAY OF MAGNESIUM: CPT

## 2024-07-13 PROCEDURE — 84484 ASSAY OF TROPONIN QUANT: CPT

## 2024-07-13 PROCEDURE — 84703 CHORIONIC GONADOTROPIN ASSAY: CPT

## 2024-07-13 PROCEDURE — 71045 X-RAY EXAM CHEST 1 VIEW: CPT

## 2024-07-13 PROCEDURE — RXMED WILLOW AMBULATORY MEDICATION CHARGE: Performed by: EMERGENCY MEDICINE

## 2024-07-13 PROCEDURE — 700111 HCHG RX REV CODE 636 W/ 250 OVERRIDE (IP): Mod: JZ,UD | Performed by: EMERGENCY MEDICINE

## 2024-07-13 PROCEDURE — 99285 EMERGENCY DEPT VISIT HI MDM: CPT

## 2024-07-13 PROCEDURE — 93005 ELECTROCARDIOGRAM TRACING: CPT | Performed by: EMERGENCY MEDICINE

## 2024-07-13 PROCEDURE — 80053 COMPREHEN METABOLIC PANEL: CPT

## 2024-07-13 PROCEDURE — 99223 1ST HOSP IP/OBS HIGH 75: CPT | Performed by: STUDENT IN AN ORGANIZED HEALTH CARE EDUCATION/TRAINING PROGRAM

## 2024-07-13 RX ORDER — FUROSEMIDE 10 MG/ML
20 INJECTION INTRAMUSCULAR; INTRAVENOUS ONCE
Status: COMPLETED | OUTPATIENT
Start: 2024-07-13 | End: 2024-07-13

## 2024-07-13 RX ORDER — HEPARIN SODIUM 5000 [USP'U]/ML
5000 INJECTION, SOLUTION INTRAVENOUS; SUBCUTANEOUS EVERY 8 HOURS
Status: DISCONTINUED | OUTPATIENT
Start: 2024-07-14 | End: 2024-07-14

## 2024-07-13 RX ORDER — ONDANSETRON 2 MG/ML
4 INJECTION INTRAMUSCULAR; INTRAVENOUS EVERY 4 HOURS PRN
Status: DISCONTINUED | OUTPATIENT
Start: 2024-07-13 | End: 2024-07-15 | Stop reason: HOSPADM

## 2024-07-13 RX ORDER — LABETALOL HYDROCHLORIDE 5 MG/ML
10 INJECTION, SOLUTION INTRAVENOUS EVERY 4 HOURS PRN
Status: DISCONTINUED | OUTPATIENT
Start: 2024-07-13 | End: 2024-07-14

## 2024-07-13 RX ORDER — PROCHLORPERAZINE EDISYLATE 5 MG/ML
5-10 INJECTION INTRAMUSCULAR; INTRAVENOUS EVERY 4 HOURS PRN
Status: DISCONTINUED | OUTPATIENT
Start: 2024-07-13 | End: 2024-07-15 | Stop reason: HOSPADM

## 2024-07-13 RX ORDER — FUROSEMIDE 20 MG/1
20 TABLET ORAL DAILY
Qty: 30 TABLET | Refills: 1 | Status: SHIPPED | OUTPATIENT
Start: 2024-07-13

## 2024-07-13 RX ORDER — ACETAMINOPHEN 325 MG/1
650 TABLET ORAL EVERY 6 HOURS PRN
Status: DISCONTINUED | OUTPATIENT
Start: 2024-07-13 | End: 2024-07-15 | Stop reason: HOSPADM

## 2024-07-13 RX ORDER — PROMETHAZINE HYDROCHLORIDE 25 MG/1
12.5-25 SUPPOSITORY RECTAL EVERY 4 HOURS PRN
Status: DISCONTINUED | OUTPATIENT
Start: 2024-07-13 | End: 2024-07-15 | Stop reason: HOSPADM

## 2024-07-13 RX ORDER — CARVEDILOL 3.12 MG/1
3.12 TABLET ORAL 2 TIMES DAILY
Qty: 180 TABLET | Refills: 3 | Status: SHIPPED | OUTPATIENT
Start: 2024-07-13 | End: 2024-07-15

## 2024-07-13 RX ORDER — PROMETHAZINE HYDROCHLORIDE 25 MG/1
12.5-25 TABLET ORAL EVERY 4 HOURS PRN
Status: DISCONTINUED | OUTPATIENT
Start: 2024-07-13 | End: 2024-07-15 | Stop reason: HOSPADM

## 2024-07-13 RX ORDER — ONDANSETRON 4 MG/1
4 TABLET, ORALLY DISINTEGRATING ORAL EVERY 4 HOURS PRN
Status: DISCONTINUED | OUTPATIENT
Start: 2024-07-13 | End: 2024-07-15 | Stop reason: HOSPADM

## 2024-07-13 RX ORDER — FUROSEMIDE 10 MG/ML
40 INJECTION INTRAMUSCULAR; INTRAVENOUS ONCE
Status: COMPLETED | OUTPATIENT
Start: 2024-07-13 | End: 2024-07-13

## 2024-07-13 RX ADMIN — FUROSEMIDE 20 MG: 10 INJECTION, SOLUTION INTRAVENOUS at 20:18

## 2024-07-13 RX ADMIN — FUROSEMIDE 40 MG: 10 INJECTION, SOLUTION INTRAVENOUS at 23:04

## 2024-07-14 ENCOUNTER — APPOINTMENT (OUTPATIENT)
Dept: CARDIOLOGY | Facility: MEDICAL CENTER | Age: 44
DRG: 291 | End: 2024-07-14
Attending: STUDENT IN AN ORGANIZED HEALTH CARE EDUCATION/TRAINING PROGRAM
Payer: COMMERCIAL

## 2024-07-14 PROBLEM — D75.1 POLYCYTHEMIA: Status: ACTIVE | Noted: 2024-07-14

## 2024-07-14 PROBLEM — F15.10 METHAMPHETAMINE USE (HCC): Status: ACTIVE | Noted: 2024-07-14

## 2024-07-14 LAB
ALBUMIN SERPL BCP-MCNC: 3.8 G/DL (ref 3.2–4.9)
ALBUMIN/GLOB SERPL: 1.4 G/DL
ALP SERPL-CCNC: 76 U/L (ref 30–99)
ALT SERPL-CCNC: 41 U/L (ref 2–50)
AMPHET UR QL SCN: POSITIVE
ANION GAP SERPL CALC-SCNC: 12 MMOL/L (ref 7–16)
AST SERPL-CCNC: 53 U/L (ref 12–45)
BARBITURATES UR QL SCN: NEGATIVE
BASOPHILS # BLD AUTO: 1.1 % (ref 0–1.8)
BASOPHILS # BLD: 0.08 K/UL (ref 0–0.12)
BENZODIAZ UR QL SCN: NEGATIVE
BILIRUB SERPL-MCNC: 1.7 MG/DL (ref 0.1–1.5)
BUN SERPL-MCNC: 17 MG/DL (ref 8–22)
BZE UR QL SCN: NEGATIVE
CALCIUM ALBUM COR SERPL-MCNC: 9.5 MG/DL (ref 8.5–10.5)
CALCIUM SERPL-MCNC: 9.3 MG/DL (ref 8.5–10.5)
CANNABINOIDS UR QL SCN: POSITIVE
CHLORIDE SERPL-SCNC: 99 MMOL/L (ref 96–112)
CO2 SERPL-SCNC: 24 MMOL/L (ref 20–33)
CREAT SERPL-MCNC: 1.34 MG/DL (ref 0.5–1.4)
EOSINOPHIL # BLD AUTO: 0.21 K/UL (ref 0–0.51)
EOSINOPHIL NFR BLD: 3 % (ref 0–6.9)
ERYTHROCYTE [DISTWIDTH] IN BLOOD BY AUTOMATED COUNT: 47.9 FL (ref 35.9–50)
FENTANYL UR QL: NEGATIVE
GFR SERPLBLD CREATININE-BSD FMLA CKD-EPI: 67 ML/MIN/1.73 M 2
GLOBULIN SER CALC-MCNC: 2.8 G/DL (ref 1.9–3.5)
GLUCOSE SERPL-MCNC: 151 MG/DL (ref 65–99)
HCT VFR BLD AUTO: 54.1 % (ref 42–52)
HGB BLD-MCNC: 18.7 G/DL (ref 14–18)
IMM GRANULOCYTES # BLD AUTO: 0.02 K/UL (ref 0–0.11)
IMM GRANULOCYTES NFR BLD AUTO: 0.3 % (ref 0–0.9)
LV EJECT FRACT  99904: 15
LV EJECT FRACT MOD 2C 99903: 21.85
LV EJECT FRACT MOD 4C 99902: 11.54
LV EJECT FRACT MOD BP 99901: 14.84
LYMPHOCYTES # BLD AUTO: 3.15 K/UL (ref 1–4.8)
LYMPHOCYTES NFR BLD: 44.9 % (ref 22–41)
MCH RBC QN AUTO: 32.7 PG (ref 27–33)
MCHC RBC AUTO-ENTMCNC: 34.6 G/DL (ref 32.3–36.5)
MCV RBC AUTO: 94.6 FL (ref 81.4–97.8)
METHADONE UR QL SCN: NEGATIVE
MONOCYTES # BLD AUTO: 0.55 K/UL (ref 0–0.85)
MONOCYTES NFR BLD AUTO: 7.8 % (ref 0–13.4)
NEUTROPHILS # BLD AUTO: 3 K/UL (ref 1.82–7.42)
NEUTROPHILS NFR BLD: 42.9 % (ref 44–72)
NRBC # BLD AUTO: 0 K/UL
NRBC BLD-RTO: 0 /100 WBC (ref 0–0.2)
OPIATES UR QL SCN: NEGATIVE
OXYCODONE UR QL SCN: NEGATIVE
PCP UR QL SCN: NEGATIVE
PLATELET # BLD AUTO: 232 K/UL (ref 164–446)
PMV BLD AUTO: 9.4 FL (ref 9–12.9)
POTASSIUM SERPL-SCNC: 4.4 MMOL/L (ref 3.6–5.5)
PROPOXYPH UR QL SCN: NEGATIVE
PROT SERPL-MCNC: 6.6 G/DL (ref 6–8.2)
RBC # BLD AUTO: 5.72 M/UL (ref 4.7–6.1)
SODIUM SERPL-SCNC: 135 MMOL/L (ref 135–145)
TROPONIN T SERPL-MCNC: 80 NG/L (ref 6–19)
WBC # BLD AUTO: 7 K/UL (ref 4.8–10.8)

## 2024-07-14 PROCEDURE — 700111 HCHG RX REV CODE 636 W/ 250 OVERRIDE (IP): Performed by: STUDENT IN AN ORGANIZED HEALTH CARE EDUCATION/TRAINING PROGRAM

## 2024-07-14 PROCEDURE — 85025 COMPLETE CBC W/AUTO DIFF WBC: CPT

## 2024-07-14 PROCEDURE — 770020 HCHG ROOM/CARE - TELE (206)

## 2024-07-14 PROCEDURE — 80053 COMPREHEN METABOLIC PANEL: CPT

## 2024-07-14 PROCEDURE — 700102 HCHG RX REV CODE 250 W/ 637 OVERRIDE(OP): Performed by: STUDENT IN AN ORGANIZED HEALTH CARE EDUCATION/TRAINING PROGRAM

## 2024-07-14 PROCEDURE — 93306 TTE W/DOPPLER COMPLETE: CPT

## 2024-07-14 PROCEDURE — 700117 HCHG RX CONTRAST REV CODE 255: Performed by: STUDENT IN AN ORGANIZED HEALTH CARE EDUCATION/TRAINING PROGRAM

## 2024-07-14 PROCEDURE — 84484 ASSAY OF TROPONIN QUANT: CPT

## 2024-07-14 PROCEDURE — 99232 SBSQ HOSP IP/OBS MODERATE 35: CPT | Performed by: STUDENT IN AN ORGANIZED HEALTH CARE EDUCATION/TRAINING PROGRAM

## 2024-07-14 PROCEDURE — 700111 HCHG RX REV CODE 636 W/ 250 OVERRIDE (IP): Mod: JZ | Performed by: STUDENT IN AN ORGANIZED HEALTH CARE EDUCATION/TRAINING PROGRAM

## 2024-07-14 PROCEDURE — 36415 COLL VENOUS BLD VENIPUNCTURE: CPT

## 2024-07-14 PROCEDURE — 93306 TTE W/DOPPLER COMPLETE: CPT | Mod: 26 | Performed by: INTERNAL MEDICINE

## 2024-07-14 PROCEDURE — A9270 NON-COVERED ITEM OR SERVICE: HCPCS | Performed by: STUDENT IN AN ORGANIZED HEALTH CARE EDUCATION/TRAINING PROGRAM

## 2024-07-14 RX ORDER — LISINOPRIL 5 MG/1
2.5 TABLET ORAL
Status: DISCONTINUED | OUTPATIENT
Start: 2024-07-14 | End: 2024-07-15 | Stop reason: HOSPADM

## 2024-07-14 RX ORDER — HYDRALAZINE HYDROCHLORIDE 20 MG/ML
20 INJECTION INTRAMUSCULAR; INTRAVENOUS EVERY 6 HOURS PRN
Status: DISCONTINUED | OUTPATIENT
Start: 2024-07-14 | End: 2024-07-15 | Stop reason: HOSPADM

## 2024-07-14 RX ORDER — ENOXAPARIN SODIUM 100 MG/ML
40 INJECTION SUBCUTANEOUS DAILY
Status: DISCONTINUED | OUTPATIENT
Start: 2024-07-14 | End: 2024-07-15 | Stop reason: HOSPADM

## 2024-07-14 RX ORDER — FUROSEMIDE 10 MG/ML
40 INJECTION INTRAMUSCULAR; INTRAVENOUS
Status: DISCONTINUED | OUTPATIENT
Start: 2024-07-14 | End: 2024-07-15

## 2024-07-14 RX ORDER — CARVEDILOL 3.12 MG/1
3.12 TABLET ORAL 2 TIMES DAILY WITH MEALS
Status: DISCONTINUED | OUTPATIENT
Start: 2024-07-14 | End: 2024-07-15 | Stop reason: HOSPADM

## 2024-07-14 RX ADMIN — ENOXAPARIN SODIUM 40 MG: 100 INJECTION SUBCUTANEOUS at 17:29

## 2024-07-14 RX ADMIN — HUMAN ALBUMIN MICROSPHERES AND PERFLUTREN 3 ML: 10; .22 INJECTION, SOLUTION INTRAVENOUS at 16:30

## 2024-07-14 RX ADMIN — FUROSEMIDE 40 MG: 10 INJECTION INTRAMUSCULAR; INTRAVENOUS at 17:29

## 2024-07-14 RX ADMIN — LISINOPRIL 2.5 MG: 5 TABLET ORAL at 05:28

## 2024-07-14 RX ADMIN — CARVEDILOL 3.12 MG: 3.12 TABLET, FILM COATED ORAL at 08:39

## 2024-07-14 RX ADMIN — CARVEDILOL 3.12 MG: 3.12 TABLET, FILM COATED ORAL at 17:29

## 2024-07-14 RX ADMIN — FUROSEMIDE 40 MG: 10 INJECTION INTRAMUSCULAR; INTRAVENOUS at 05:28

## 2024-07-14 ASSESSMENT — LIFESTYLE VARIABLES
EVER HAD A DRINK FIRST THING IN THE MORNING TO STEADY YOUR NERVES TO GET RID OF A HANGOVER: NO
TOTAL SCORE: 0
TOTAL SCORE: 0
ON A TYPICAL DAY WHEN YOU DRINK ALCOHOL HOW MANY DRINKS DO YOU HAVE: 0
HAVE PEOPLE ANNOYED YOU BY CRITICIZING YOUR DRINKING: NO
CONSUMPTION TOTAL: INCOMPLETE
DOES PATIENT WANT TO STOP DRINKING: NO
AVERAGE NUMBER OF DAYS PER WEEK YOU HAVE A DRINK CONTAINING ALCOHOL: 0
ALCOHOL_USE: NO
EVER FELT BAD OR GUILTY ABOUT YOUR DRINKING: NO
HAVE YOU EVER FELT YOU SHOULD CUT DOWN ON YOUR DRINKING: NO
CONSUMPTION TOTAL: INCOMPLETE
TOTAL SCORE: 0
HOW MANY TIMES IN THE PAST YEAR HAVE YOU HAD 5 OR MORE DRINKS IN A DAY: 0

## 2024-07-14 ASSESSMENT — COGNITIVE AND FUNCTIONAL STATUS - GENERAL
SUGGESTED CMS G CODE MODIFIER DAILY ACTIVITY: CH
SUGGESTED CMS G CODE MODIFIER MOBILITY: CH
MOBILITY SCORE: 24
DAILY ACTIVITIY SCORE: 24

## 2024-07-14 ASSESSMENT — ENCOUNTER SYMPTOMS
GASTROINTESTINAL NEGATIVE: 1
COUGH: 1
EYES NEGATIVE: 1
WEAKNESS: 1
PSYCHIATRIC NEGATIVE: 1
SHORTNESS OF BREATH: 1
MUSCULOSKELETAL NEGATIVE: 1

## 2024-07-14 ASSESSMENT — PATIENT HEALTH QUESTIONNAIRE - PHQ9
1. LITTLE INTEREST OR PLEASURE IN DOING THINGS: NOT AT ALL
2. FEELING DOWN, DEPRESSED, IRRITABLE, OR HOPELESS: NOT AT ALL
1. LITTLE INTEREST OR PLEASURE IN DOING THINGS: NOT AT ALL
SUM OF ALL RESPONSES TO PHQ9 QUESTIONS 1 AND 2: 0
SUM OF ALL RESPONSES TO PHQ9 QUESTIONS 1 AND 2: 0
2. FEELING DOWN, DEPRESSED, IRRITABLE, OR HOPELESS: NOT AT ALL

## 2024-07-14 ASSESSMENT — FIBROSIS 4 INDEX
FIB4 SCORE: 1.5
FIB4 SCORE: 1.53

## 2024-07-14 ASSESSMENT — PAIN DESCRIPTION - PAIN TYPE
TYPE: ACUTE PAIN

## 2024-07-15 ENCOUNTER — PHARMACY VISIT (OUTPATIENT)
Dept: PHARMACY | Facility: MEDICAL CENTER | Age: 44
End: 2024-07-15
Payer: COMMERCIAL

## 2024-07-15 VITALS
BODY MASS INDEX: 27.76 KG/M2 | TEMPERATURE: 97.2 F | DIASTOLIC BLOOD PRESSURE: 90 MMHG | SYSTOLIC BLOOD PRESSURE: 130 MMHG | RESPIRATION RATE: 17 BRPM | WEIGHT: 187.39 LBS | OXYGEN SATURATION: 98 % | HEIGHT: 69 IN | HEART RATE: 93 BPM

## 2024-07-15 LAB
ALBUMIN SERPL BCP-MCNC: 3.2 G/DL (ref 3.2–4.9)
ALBUMIN/GLOB SERPL: 1.1 G/DL
ALP SERPL-CCNC: 75 U/L (ref 30–99)
ALT SERPL-CCNC: 41 U/L (ref 2–50)
ANION GAP SERPL CALC-SCNC: 13 MMOL/L (ref 7–16)
AST SERPL-CCNC: 58 U/L (ref 12–45)
BASOPHILS # BLD AUTO: 1.3 % (ref 0–1.8)
BASOPHILS # BLD: 0.1 K/UL (ref 0–0.12)
BILIRUB SERPL-MCNC: 0.9 MG/DL (ref 0.1–1.5)
BUN SERPL-MCNC: 20 MG/DL (ref 8–22)
CALCIUM ALBUM COR SERPL-MCNC: 9.6 MG/DL (ref 8.5–10.5)
CALCIUM SERPL-MCNC: 9 MG/DL (ref 8.5–10.5)
CHLORIDE SERPL-SCNC: 100 MMOL/L (ref 96–112)
CO2 SERPL-SCNC: 24 MMOL/L (ref 20–33)
CREAT SERPL-MCNC: 1.32 MG/DL (ref 0.5–1.4)
EOSINOPHIL # BLD AUTO: 0.37 K/UL (ref 0–0.51)
EOSINOPHIL NFR BLD: 4.7 % (ref 0–6.9)
ERYTHROCYTE [DISTWIDTH] IN BLOOD BY AUTOMATED COUNT: 46.8 FL (ref 35.9–50)
GFR SERPLBLD CREATININE-BSD FMLA CKD-EPI: 69 ML/MIN/1.73 M 2
GLOBULIN SER CALC-MCNC: 2.9 G/DL (ref 1.9–3.5)
GLUCOSE SERPL-MCNC: 112 MG/DL (ref 65–99)
HCT VFR BLD AUTO: 53.8 % (ref 42–52)
HGB BLD-MCNC: 18.8 G/DL (ref 14–18)
IMM GRANULOCYTES # BLD AUTO: 0.02 K/UL (ref 0–0.11)
IMM GRANULOCYTES NFR BLD AUTO: 0.3 % (ref 0–0.9)
LYMPHOCYTES # BLD AUTO: 3.43 K/UL (ref 1–4.8)
LYMPHOCYTES NFR BLD: 43.4 % (ref 22–41)
MAGNESIUM SERPL-MCNC: 1.7 MG/DL (ref 1.5–2.5)
MCH RBC QN AUTO: 32.2 PG (ref 27–33)
MCHC RBC AUTO-ENTMCNC: 34.9 G/DL (ref 32.3–36.5)
MCV RBC AUTO: 92.3 FL (ref 81.4–97.8)
MONOCYTES # BLD AUTO: 0.66 K/UL (ref 0–0.85)
MONOCYTES NFR BLD AUTO: 8.4 % (ref 0–13.4)
NEUTROPHILS # BLD AUTO: 3.32 K/UL (ref 1.82–7.42)
NEUTROPHILS NFR BLD: 41.9 % (ref 44–72)
NRBC # BLD AUTO: 0.02 K/UL
NRBC BLD-RTO: 0.3 /100 WBC (ref 0–0.2)
PHOSPHATE SERPL-MCNC: 5.5 MG/DL (ref 2.5–4.5)
PLATELET # BLD AUTO: 254 K/UL (ref 164–446)
PMV BLD AUTO: 9.4 FL (ref 9–12.9)
POTASSIUM SERPL-SCNC: 4 MMOL/L (ref 3.6–5.5)
PROT SERPL-MCNC: 6.1 G/DL (ref 6–8.2)
RBC # BLD AUTO: 5.83 M/UL (ref 4.7–6.1)
SODIUM SERPL-SCNC: 137 MMOL/L (ref 135–145)
WBC # BLD AUTO: 7.9 K/UL (ref 4.8–10.8)

## 2024-07-15 PROCEDURE — 700111 HCHG RX REV CODE 636 W/ 250 OVERRIDE (IP): Mod: JZ | Performed by: STUDENT IN AN ORGANIZED HEALTH CARE EDUCATION/TRAINING PROGRAM

## 2024-07-15 PROCEDURE — A9270 NON-COVERED ITEM OR SERVICE: HCPCS | Performed by: STUDENT IN AN ORGANIZED HEALTH CARE EDUCATION/TRAINING PROGRAM

## 2024-07-15 PROCEDURE — 84100 ASSAY OF PHOSPHORUS: CPT

## 2024-07-15 PROCEDURE — RXMED WILLOW AMBULATORY MEDICATION CHARGE: Performed by: STUDENT IN AN ORGANIZED HEALTH CARE EDUCATION/TRAINING PROGRAM

## 2024-07-15 PROCEDURE — 80053 COMPREHEN METABOLIC PANEL: CPT

## 2024-07-15 PROCEDURE — 83735 ASSAY OF MAGNESIUM: CPT

## 2024-07-15 PROCEDURE — 700102 HCHG RX REV CODE 250 W/ 637 OVERRIDE(OP): Performed by: STUDENT IN AN ORGANIZED HEALTH CARE EDUCATION/TRAINING PROGRAM

## 2024-07-15 PROCEDURE — 85025 COMPLETE CBC W/AUTO DIFF WBC: CPT

## 2024-07-15 PROCEDURE — 99239 HOSP IP/OBS DSCHRG MGMT >30: CPT | Performed by: STUDENT IN AN ORGANIZED HEALTH CARE EDUCATION/TRAINING PROGRAM

## 2024-07-15 PROCEDURE — 99222 1ST HOSP IP/OBS MODERATE 55: CPT | Mod: GC | Performed by: INTERNAL MEDICINE

## 2024-07-15 RX ORDER — LISINOPRIL 2.5 MG/1
2.5 TABLET ORAL DAILY
Qty: 30 TABLET | Refills: 0 | Status: SHIPPED | OUTPATIENT
Start: 2024-07-16 | End: 2024-07-18 | Stop reason: SDUPTHER

## 2024-07-15 RX ORDER — SPIRONOLACTONE 25 MG/1
12.5 TABLET ORAL
Status: DISCONTINUED | OUTPATIENT
Start: 2024-07-15 | End: 2024-07-15

## 2024-07-15 RX ORDER — FUROSEMIDE 20 MG/1
20 TABLET ORAL
Status: DISCONTINUED | OUTPATIENT
Start: 2024-07-15 | End: 2024-07-15 | Stop reason: HOSPADM

## 2024-07-15 RX ORDER — DAPAGLIFLOZIN 10 MG/1
10 TABLET, FILM COATED ORAL DAILY
Qty: 30 TABLET | Refills: 0 | Status: SHIPPED | OUTPATIENT
Start: 2024-07-16 | End: 2024-07-18 | Stop reason: SDUPTHER

## 2024-07-15 RX ORDER — CARVEDILOL 3.12 MG/1
3.12 TABLET ORAL 2 TIMES DAILY WITH MEALS
Qty: 60 TABLET | Refills: 0 | Status: SHIPPED | OUTPATIENT
Start: 2024-07-15 | End: 2024-07-18 | Stop reason: SDUPTHER

## 2024-07-15 RX ORDER — DAPAGLIFLOZIN 10 MG/1
10 TABLET, FILM COATED ORAL DAILY
Status: DISCONTINUED | OUTPATIENT
Start: 2024-07-15 | End: 2024-07-15 | Stop reason: HOSPADM

## 2024-07-15 RX ORDER — FUROSEMIDE 20 MG/1
20 TABLET ORAL DAILY
Qty: 30 TABLET | Refills: 0 | Status: SHIPPED | OUTPATIENT
Start: 2024-07-15 | End: 2024-07-18

## 2024-07-15 RX ADMIN — FUROSEMIDE 40 MG: 10 INJECTION INTRAMUSCULAR; INTRAVENOUS at 05:23

## 2024-07-15 RX ADMIN — DAPAGLIFLOZIN 10 MG: 10 TABLET, FILM COATED ORAL at 08:15

## 2024-07-15 RX ADMIN — CARVEDILOL 3.12 MG: 3.12 TABLET, FILM COATED ORAL at 08:15

## 2024-07-15 RX ADMIN — LISINOPRIL 2.5 MG: 5 TABLET ORAL at 05:23

## 2024-07-15 ASSESSMENT — FIBROSIS 4 INDEX: FIB4 SCORE: 1.53

## 2024-07-18 ENCOUNTER — OFFICE VISIT (OUTPATIENT)
Dept: CARDIOLOGY | Facility: MEDICAL CENTER | Age: 44
End: 2024-07-18
Attending: PHYSICIAN ASSISTANT
Payer: COMMERCIAL

## 2024-07-18 VITALS
HEART RATE: 91 BPM | OXYGEN SATURATION: 98 % | WEIGHT: 180.8 LBS | HEIGHT: 69 IN | BODY MASS INDEX: 26.78 KG/M2 | RESPIRATION RATE: 14 BRPM | DIASTOLIC BLOOD PRESSURE: 62 MMHG | SYSTOLIC BLOOD PRESSURE: 128 MMHG

## 2024-07-18 DIAGNOSIS — I42.9 CARDIOMYOPATHY, UNSPECIFIED TYPE (HCC): ICD-10-CM

## 2024-07-18 DIAGNOSIS — I50.9 HEART FAILURE, NYHA CLASS 2 (HCC): ICD-10-CM

## 2024-07-18 DIAGNOSIS — R73.03 PREDIABETES: ICD-10-CM

## 2024-07-18 DIAGNOSIS — N17.9 AKI (ACUTE KIDNEY INJURY) (HCC): ICD-10-CM

## 2024-07-18 DIAGNOSIS — F15.10 METHAMPHETAMINE USE (HCC): ICD-10-CM

## 2024-07-18 DIAGNOSIS — I10 ESSENTIAL HYPERTENSION: ICD-10-CM

## 2024-07-18 DIAGNOSIS — Z00.00 HEALTHCARE MAINTENANCE: ICD-10-CM

## 2024-07-18 DIAGNOSIS — I50.21 ACUTE SYSTOLIC HEART FAILURE (HCC): ICD-10-CM

## 2024-07-18 DIAGNOSIS — I50.20 ACC/AHA STAGE C HEART FAILURE WITH REDUCED EJECTION FRACTION (HCC): ICD-10-CM

## 2024-07-18 DIAGNOSIS — D75.1 POLYCYTHEMIA: ICD-10-CM

## 2024-07-18 PROCEDURE — 3078F DIAST BP <80 MM HG: CPT | Performed by: PHYSICIAN ASSISTANT

## 2024-07-18 PROCEDURE — 3074F SYST BP LT 130 MM HG: CPT | Performed by: PHYSICIAN ASSISTANT

## 2024-07-18 PROCEDURE — 99214 OFFICE O/P EST MOD 30 MIN: CPT | Performed by: PHYSICIAN ASSISTANT

## 2024-07-18 PROCEDURE — 99211 OFF/OP EST MAY X REQ PHY/QHP: CPT | Performed by: PHYSICIAN ASSISTANT

## 2024-07-18 RX ORDER — DAPAGLIFLOZIN 10 MG/1
10 TABLET, FILM COATED ORAL DAILY
Qty: 90 TABLET | Refills: 0 | Status: SHIPPED | OUTPATIENT
Start: 2024-07-18

## 2024-07-18 RX ORDER — CARVEDILOL 3.12 MG/1
3.12 TABLET ORAL 2 TIMES DAILY WITH MEALS
Qty: 180 TABLET | Refills: 0 | Status: SHIPPED | OUTPATIENT
Start: 2024-07-18

## 2024-07-18 RX ORDER — LISINOPRIL 2.5 MG/1
2.5 TABLET ORAL DAILY
Qty: 90 TABLET | Refills: 0 | Status: SHIPPED | OUTPATIENT
Start: 2024-07-18

## 2024-07-18 RX ORDER — SPIRONOLACTONE 25 MG/1
12.5 TABLET ORAL DAILY
Qty: 90 TABLET | Refills: 0 | Status: SHIPPED | OUTPATIENT
Start: 2024-07-18

## 2024-07-18 ASSESSMENT — FIBROSIS 4 INDEX: FIB4 SCORE: 1.53

## 2024-07-18 NOTE — PATIENT INSTRUCTIONS
Please see if [facility] can help you with primary care and sober living.  [id number], [place] [id number]  [phone]

## 2024-07-18 NOTE — PROGRESS NOTES
Chief Complaint   Patient presents with    Hypertension     F/V Dx: Essential hypertension    Congestive Heart Failure     F/V Dx: Acute systolic heart failure (HCC)           Subjective     Fariha Ortega is a 44 y.o. male with a history of HFrEF, hypertension, prior alcohol and methamphetamine abuse who presents today for hospital follow-up.    He was previously followed by Dr. Adan.  Last seen in clinic 4/27/2022 by Chary Rodgers.  At that exam, he was seen after being lost to follow-up for quite some time.  He was doing well from a cardiac standpoint and did not have any symptoms.  He was recommended to start lisinopril and to resume Coreg.  He was due to repeat an echocardiogram and labs with follow-up in 3 months, but was lost to follow-up again.  7/13/2024 he presented to the emergency department with concerns of leg swelling.  He was found to be in an acute heart failure exacerbation.  His UDS was positive for amphetamines and cannabinoids.  A repeat echocardiogram demonstrated an LVEF of 15%.  He was restarted on the heart failure GDMT.  He also had some polycythemia on admission and was recommended to follow-up with his primary care.    Cardiovascular Risk Factors:  1. Smoking status: Former  2. Type II Diabetes Mellitus: No   Lab Results   Component Value Date/Time    HBA1C 6.4 (H) 10/31/2019 10:52 AM     3. Hypertension: Yes  4. Dyslipidemia: Yes   Cholesterol,Tot   Date Value Ref Range Status   11/01/2019 117 100 - 199 mg/dL Final     LDL   Date Value Ref Range Status   11/01/2019 43 <100 mg/dL Final     HDL   Date Value Ref Range Status   11/01/2019 35 (A) >=40 mg/dL Final     Triglycerides   Date Value Ref Range Status   11/01/2019 196 (H) 0 - 149 mg/dL Final     5. Family history of early Coronary Artery Disease in a first degree relative (Male less than 55 years of age; Female less than 65 years of age): ***  6.  Obesity and/or Metabolic Syndrome: No  7. Sedentary lifestyle: Yes     Past  Medical History:   Diagnosis Date    Congestive heart failure (HCC)      History reviewed. No pertinent surgical history.  Family History   Problem Relation Age of Onset    Heart Disease Mother     Hypertension Mother     Cancer Father      Social History     Socioeconomic History    Marital status: Single     Spouse name: Not on file    Number of children: Not on file    Years of education: Not on file    Highest education level: Not on file   Occupational History    Not on file   Tobacco Use    Smoking status: Former     Current packs/day: 0.00     Types: Cigarettes     Quit date: 2016     Years since quittin.6    Smokeless tobacco: Never   Vaping Use    Vaping status: Former    Substances: Nicotine   Substance and Sexual Activity    Alcohol use: Not Currently     Alcohol/week: 9.0 oz     Types: 15 Cans of beer per week     Comment: occ    Drug use: Yes     Comment: mj    Sexual activity: Not on file   Other Topics Concern    Not on file   Social History Narrative    Not on file     Social Determinants of Health     Financial Resource Strain: Not on file   Food Insecurity: Not on file   Transportation Needs: Not on file   Physical Activity: Not on file   Stress: Not on file   Social Connections: Not on file   Intimate Partner Violence: Not At Risk (2024)    Humiliation, Afraid, Rape, and Kick questionnaire     Fear of Current or Ex-Partner: No     Emotionally Abused: No     Physically Abused: No     Sexually Abused: No   Housing Stability: Not on file     No Known Allergies  Outpatient Encounter Medications as of 2024   Medication Sig Dispense Refill    carvedilol (COREG) 3.125 MG Tab Take 1 Tablet by mouth 2 times a day with meals. 60 Tablet 0    dapagliflozin propanediol (FARXIGA) 10 MG Tab Take 1 Tablet by mouth every day. 30 Tablet 0    lisinopril (PRINIVIL) 2.5 MG Tab Take 1 Tablet by mouth every day. 30 Tablet 0    furosemide (LASIX) 20 MG Tab Take 1 Tablet by mouth every day. 30 Tablet 1  "   furosemide (LASIX) 20 MG Tab Take 1 Tablet by mouth every day. (Patient not taking: Reported on 7/18/2024) 30 Tablet 0     No facility-administered encounter medications on file as of 7/18/2024.     ROS           Objective     BP (!) 0/0 (BP Location: Left arm, Patient Position: Sitting, BP Cuff Size: Adult)   Ht 1.753 m (5' 9.02\")   BMI 27.66 kg/m²     Physical Exam           Assessment & Plan     No diagnosis found.    Medical Decision Making: Today's Assessment/Status/Plan:                        "      General: He is not in acute distress.     Appearance: Normal appearance.   HENT:      Head: Normocephalic and atraumatic.      Right Ear: External ear normal.      Left Ear: External ear normal.   Eyes:      General: No scleral icterus.     Extraocular Movements: Extraocular movements intact.      Conjunctiva/sclera: Conjunctivae normal.      Pupils: Pupils are equal, round, and reactive to light.   Cardiovascular:      Rate and Rhythm: Normal rate and regular rhythm.      Pulses: Normal pulses.      Heart sounds: Normal heart sounds. No murmur heard.     No friction rub. No gallop.   Pulmonary:      Effort: Pulmonary effort is normal.      Breath sounds: Normal breath sounds.   Abdominal:      General: Bowel sounds are normal.      Palpations: Abdomen is soft.      Tenderness: There is no abdominal tenderness.   Musculoskeletal:         General: Normal range of motion.      Cervical back: Normal range of motion and neck supple.      Right lower leg: No edema.      Left lower leg: No edema.   Skin:     General: Skin is warm and dry.      Capillary Refill: Capillary refill takes less than 2 seconds.   Neurological:      General: No focal deficit present.      Mental Status: He is alert and oriented to person, place, and time.   Psychiatric:         Mood and Affect: Mood normal.         Behavior: Behavior normal.         Judgment: Judgment normal.       Lab Results   Component Value Date/Time    CHOLSTRLTOT 117 11/01/2019 04:06 AM    LDL 43 11/01/2019 04:06 AM    HDL 35 (A) 11/01/2019 04:06 AM    TRIGLYCERIDE 196 (H) 11/01/2019 04:06 AM       Lab Results   Component Value Date/Time    SODIUM 137 07/15/2024 02:17 AM    POTASSIUM 4.0 07/15/2024 02:17 AM    CHLORIDE 100 07/15/2024 02:17 AM    CO2 24 07/15/2024 02:17 AM    GLUCOSE 112 (H) 07/15/2024 02:17 AM    BUN 20 07/15/2024 02:17 AM    CREATININE 1.32 07/15/2024 02:17 AM     Lab Results   Component Value Date/Time    ALKPHOSPHAT 75 07/15/2024 02:17 AM    ASTSGOT  58 (H) 07/15/2024 02:17 AM    ALTSGPT 41 07/15/2024 02:17 AM    TBILIRUBIN 0.9 07/15/2024 02:17 AM       Cardiovascular imaging and procedures:    Echocardiogram 7/14/2024  CONCLUSIONS  Severely reduced left ventricular systolic function.  The left ventricular ejection fraction is visually estimated to be 15%.  Grade II diastolic dysfunction.  Mildly dilated right ventricle. Reduced right ventricular systolic   function.Moderate tricuspid regurgitation.  Estimated right ventricular systolic pressure is 67 mmHg.  Right heart pressures are consistent with severe pulmonary   hypertension.     Compared to prior echocardiogram 10/30/2019, LV function remains   severely reduced. Still with presence of severe pulmonary hypertension.    Echocardiogram 10/30/2019  CONCLUSIONS  No prior study is available for comparison.   Severely reduced left ventricular systolic function.  Left ventricular ejection fraction is visually estimated to be <20%.  Global hypokinesis.  Grade I diastolic dysfunction.  Reduced right ventricular systolic function.  Mild mitral regurgitation.  Moderate tricuspid regurgitation.  Right ventricular systolic pressure is estimated to be 70 mmHg.     These findings were communicated to the cardiology service at the time   of the exam.         Assessment & Plan     1. Prediabetes  HEMOGLOBIN A1C (Glycohemoglobin GHB Total/A1C with MBG Estimate)      2. ACC/AHA stage C heart failure with reduced ejection fraction (HCC)  dapagliflozin propanediol (FARXIGA) 10 MG Tab    carvedilol (COREG) 3.125 MG Tab    lisinopril (PRINIVIL) 2.5 MG Tab    spironolactone (ALDACTONE) 25 MG Tab    Basic Metabolic Panel    Lipid Profile      3. Heart failure, NYHA class 2 (HCC)        4. Polycythemia        5. Methamphetamine use (HCC)        6. Essential hypertension        7. Healthcare maintenance        8. CHRIS (acute kidney injury) (HCC)        9. Cardiomyopathy, unspecified type (HCC)            Medical Decision Making:  Today's Assessment/Status/Plan:        HFrEF, Stage C, Class II, LVEF 15%:   -Heart failure due to methamphetamine abuse.  -Recent HF Hospitalization precipitant was due to volume overload.  -Discussed Heart failure trajectory and prognosis with patient. Will continue to optimize medical therapy as tolerated. Advanced HF treatment, need for remote monitoring consideration at every visit.   -ACE-I/ARB/ARNI: Continue lisinopril 2.5mg daily.  -Evidence Based Beta-blocker: Continue carvediolol 3.125mg BID.  -Aldosterone Antagonist: Continue spirolactone 12.5mg daily  -Diuretic: None. He is euvolemic on exam.   -SGLT2 inhibitor: Continue farxiga 10mg daily.   -Labs: Repeat BMP, A1C, and lipid panel. Will continue to closely monitor for side effects of patient's high risk medication(s) including renal function, liver function NTproBNP/cardiac markers, and electrolytes as needed  -discussed importance of exercise and regular activity  -Discussed/reviewed maintaining a low Sodium and hydration recommendations  -Repeat Echo in 3 months, if LVEF not >35%, then will discuss/consider ICD for primary prevention if he can commit to avoiding methamphetamine.   -Reinforced s/sx of worsening heart failure with patient and weight monitoring. Pt verbalizes understanding. Pt to call office or RTC if present.    -PUMP line number [phone] (PUMP) reviewed with patient  -Heart Failure Education:   Discussed the Definition of heart failure (linking disease, symptoms, and treatment) and causes of heart failure  Recognition of escalating symptoms and concrete plan for response to particular symptoms  Discussed the indication for ACE-I/ARB/ARNI, Beta-Blocker, Aldosterone antagonist, beta-blocker, SGLT2 inhibitor  Risk modification for heart failure progression  Specific diet recommendations: individualized low-sodium diet, recommendation for alcohol intake, etc.  Specific activity and exercise recommendations  Importance of treatment  adherence  Behavioral strategies to promote treatment adherence  -Advanced care planning: To be discussed at future visits.   -Pharmacotherapy Referral: Patient not referred.  -Compliance Barriers: Substance abuse and medical noncompliance  -Genetic testing Consideration: None  -Consideration for LVAD and/or Heart transplant as necessary   - He should undergo ischemic testing when he can abstain from meth and demonstrate compliance.     Hypertension  - Blood pressure is well controlled.  - Continue antihypertensives per above.     Prediabetes  - A1C 6.4 in 2019  - Repeat A1C  - Encouraged diet and lifestyle modifications.     CHRIS  - Repeat BMP    Polycythemia  - Establish with PCP    Methamphetamine use  - Encouraged abstinence.     Health Maintenance  - Repeat BMP, Lipid panel and A1C    Follow up in 4 weeks or sooner with clinical changes.    Encouraged him to reach out via MyChart or telephone with any questions

## 2024-08-01 ENCOUNTER — TELEPHONE (OUTPATIENT)
Dept: CARDIOLOGY | Facility: MEDICAL CENTER | Age: 44
End: 2024-08-01
Payer: COMMERCIAL

## 2024-08-01 NOTE — TELEPHONE ENCOUNTER
LVM for pt in regards to lab work that was ordered at previous OV with TRENTON Espinal. Office number given for call back if pt has any questions or has had lab work done outside of [facility]. Pt has follow up appointment scheduled with Trenton Espinal.

## 2024-08-16 ENCOUNTER — TELEPHONE (OUTPATIENT)
Dept: CARDIOLOGY | Facility: MEDICAL CENTER | Age: 44
End: 2024-08-16
Payer: COMMERCIAL

## 2024-08-22 ASSESSMENT — ENCOUNTER SYMPTOMS
MYALGIAS: 0
ABDOMINAL PAIN: 0
COUGH: 0
PALPITATIONS: 0
CONSTITUTIONAL NEGATIVE: 1
FEVER: 0
GASTROINTESTINAL NEGATIVE: 1
SHORTNESS OF BREATH: 0
PND: 0
DIZZINESS: 0
ORTHOPNEA: 0
NEUROLOGICAL NEGATIVE: 1
PSYCHIATRIC NEGATIVE: 1
HEADACHES: 0
MUSCULOSKELETAL NEGATIVE: 1
BLURRED VISION: 0
CLAUDICATION: 0
CHILLS: 0
EYES NEGATIVE: 1
NAUSEA: 0
DOUBLE VISION: 0
BRUISES/BLEEDS EASILY: 0
VOMITING: 0
LOSS OF CONSCIOUSNESS: 0
WEAKNESS: 0

## 2024-11-12 DIAGNOSIS — I50.20 ACC/AHA STAGE C HEART FAILURE WITH REDUCED EJECTION FRACTION (HCC): ICD-10-CM

## 2024-11-13 RX ORDER — DAPAGLIFLOZIN 10 MG/1
10 TABLET, FILM COATED ORAL DAILY
Qty: 90 TABLET | Refills: 2 | Status: SHIPPED | OUTPATIENT
Start: 2024-11-13

## 2024-11-13 RX ORDER — LISINOPRIL 2.5 MG/1
2.5 TABLET ORAL DAILY
Qty: 90 TABLET | Refills: 2 | Status: SHIPPED | OUTPATIENT
Start: 2024-11-13

## 2024-11-13 RX ORDER — CARVEDILOL 3.12 MG/1
3.12 TABLET ORAL 2 TIMES DAILY WITH MEALS
Qty: 180 TABLET | Refills: 2 | Status: SHIPPED | OUTPATIENT
Start: 2024-11-13

## 2024-11-13 NOTE — TELEPHONE ENCOUNTER
Is the patient due for a refill? Yes    Was the patient seen the past year? Yes    Date of last office visit: 07.18.2024    Does the patient have an upcoming appointment?  Yes   If yes, When? 12.13.2024    Provider to refill: AM    Does the patient have residential Plus and need 100-day supply? (This applies to ALL medications) Patient does not have SCP

## 2024-12-06 ENCOUNTER — TELEPHONE (OUTPATIENT)
Dept: CARDIOLOGY | Facility: MEDICAL CENTER | Age: 44
End: 2024-12-06
Payer: COMMERCIAL

## 2024-12-06 NOTE — TELEPHONE ENCOUNTER
Called patient in regards to his labs that where order on his last office visit. No answer LVM for patient to please have labs done prior appt.

## 2025-01-29 ENCOUNTER — TELEPHONE (OUTPATIENT)
Dept: HEALTH INFORMATION MANAGEMENT | Facility: OTHER | Age: 45
End: 2025-01-29
Payer: COMMERCIAL

## 2025-01-29 ENCOUNTER — TELEPHONE (OUTPATIENT)
Dept: CARDIOLOGY | Facility: MEDICAL CENTER | Age: 45
End: 2025-01-29
Payer: COMMERCIAL